# Patient Record
Sex: FEMALE | Race: WHITE | NOT HISPANIC OR LATINO | URBAN - METROPOLITAN AREA
[De-identification: names, ages, dates, MRNs, and addresses within clinical notes are randomized per-mention and may not be internally consistent; named-entity substitution may affect disease eponyms.]

---

## 2018-02-24 ENCOUNTER — EMERGENCY (EMERGENCY)
Facility: HOSPITAL | Age: 68
LOS: 0 days | Discharge: HOME | End: 2018-02-24
Attending: EMERGENCY MEDICINE
Payer: MEDICARE

## 2018-02-24 VITALS
DIASTOLIC BLOOD PRESSURE: 84 MMHG | TEMPERATURE: 98 F | OXYGEN SATURATION: 100 % | RESPIRATION RATE: 18 BRPM | HEIGHT: 68 IN | WEIGHT: 293 LBS | HEART RATE: 92 BPM | SYSTOLIC BLOOD PRESSURE: 166 MMHG

## 2018-02-24 DIAGNOSIS — M54.9 DORSALGIA, UNSPECIFIED: ICD-10-CM

## 2018-02-24 DIAGNOSIS — Z76.0 ENCOUNTER FOR ISSUE OF REPEAT PRESCRIPTION: ICD-10-CM

## 2018-02-24 DIAGNOSIS — Z91.013 ALLERGY TO SEAFOOD: ICD-10-CM

## 2018-02-24 DIAGNOSIS — M19.90 UNSPECIFIED OSTEOARTHRITIS, UNSPECIFIED SITE: ICD-10-CM

## 2018-02-24 DIAGNOSIS — Z88.2 ALLERGY STATUS TO SULFONAMIDES: ICD-10-CM

## 2018-02-24 DIAGNOSIS — Z88.0 ALLERGY STATUS TO PENICILLIN: ICD-10-CM

## 2018-02-24 PROCEDURE — 99283 EMERGENCY DEPT VISIT LOW MDM: CPT

## 2018-02-24 RX ORDER — OXYCODONE HYDROCHLORIDE 5 MG/1
10 TABLET ORAL ONCE
Qty: 0 | Refills: 0 | Status: DISCONTINUED | OUTPATIENT
Start: 2018-02-24 | End: 2018-02-24

## 2018-02-24 RX ADMIN — OXYCODONE HYDROCHLORIDE 10 MILLIGRAM(S): 5 TABLET ORAL at 04:14

## 2018-02-24 NOTE — ED PROVIDER NOTE - MEDICAL DECISION MAKING DETAILS
Pt requesting medication refill due to loss off current Rx, confirmed on  pt has only been receiving monthly Rx from PMD.  Pt given one dose in ED & given Rx for weekend.  Given pain mgmt follow up for monday.  Cautioned on use of meds and discussed dangers of medication abuse.  Pt dc home to fu w PMD, instructed to return to ED if sx worsen.
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
## 2018-02-24 NOTE — ED PROVIDER NOTE - PHYSICAL EXAMINATION
Gen: Alert, NAD  Head: NC, AT, EOMI, normal lids/conjunctiva  ENT: normal hearing, patent oropharynx, MMM  Neck: supple, no tenderness/meningismus/JVD, Trachea midline, FROM  Pulm: Bilateral clear BS, normal resp effort, no wheeze/stridor/retractions  CV: RRR, no M/R/G, +dist pulses  Abd: soft, NT/ND, +BS, no guarding/rebound tenderness, +obese  Mskel: no edema/erythema/cyanosis  Skin: no rash  Neuro: AAOx3, no sensory/motor deficits

## 2021-04-05 ENCOUNTER — INPATIENT (INPATIENT)
Facility: HOSPITAL | Age: 71
LOS: 4 days | Discharge: NURSING, REHAB OR LONG-TERM CARE | End: 2021-04-10
Attending: INTERNAL MEDICINE | Admitting: INTERNAL MEDICINE
Payer: MEDICARE

## 2021-04-05 VITALS
OXYGEN SATURATION: 84 % | TEMPERATURE: 97 F | HEART RATE: 84 BPM | WEIGHT: 250 LBS | SYSTOLIC BLOOD PRESSURE: 61 MMHG | RESPIRATION RATE: 16 BRPM | HEIGHT: 68 IN | DIASTOLIC BLOOD PRESSURE: 35 MMHG

## 2021-04-05 LAB
ACETONE SERPL-MCNC: NEGATIVE — SIGNIFICANT CHANGE UP
ALBUMIN SERPL ELPH-MCNC: 2.6 G/DL — LOW (ref 3.3–5)
ALP SERPL-CCNC: 194 U/L — HIGH (ref 40–120)
ALT FLD-CCNC: 34 U/L — SIGNIFICANT CHANGE UP (ref 12–78)
ANION GAP SERPL CALC-SCNC: 12 MMOL/L — SIGNIFICANT CHANGE UP (ref 5–17)
ANION GAP SERPL CALC-SCNC: 12 MMOL/L — SIGNIFICANT CHANGE UP (ref 5–17)
ANION GAP SERPL CALC-SCNC: 17 MMOL/L — SIGNIFICANT CHANGE UP (ref 5–17)
ANISOCYTOSIS BLD QL: SLIGHT — SIGNIFICANT CHANGE UP
APPEARANCE UR: ABNORMAL
APTT BLD: 39.9 SEC — HIGH (ref 27.5–35.5)
AST SERPL-CCNC: 109 U/L — HIGH (ref 15–37)
BACTERIA # UR AUTO: ABNORMAL
BASE EXCESS BLDA CALC-SCNC: -7.6 MMOL/L — LOW (ref -2–2)
BASOPHILS # BLD AUTO: 0 K/UL — SIGNIFICANT CHANGE UP (ref 0–0.2)
BASOPHILS NFR BLD AUTO: 0 % — SIGNIFICANT CHANGE UP (ref 0–2)
BILIRUB SERPL-MCNC: 0.6 MG/DL — SIGNIFICANT CHANGE UP (ref 0.2–1.2)
BILIRUB UR-MCNC: ABNORMAL
BLD GP AB SCN SERPL QL: SIGNIFICANT CHANGE UP
BLOOD GAS COMMENTS: SIGNIFICANT CHANGE UP
BLOOD GAS COMMENTS: SIGNIFICANT CHANGE UP
BLOOD GAS SOURCE: SIGNIFICANT CHANGE UP
BUN SERPL-MCNC: 76 MG/DL — HIGH (ref 7–23)
BUN SERPL-MCNC: 77 MG/DL — HIGH (ref 7–23)
BUN SERPL-MCNC: 77 MG/DL — HIGH (ref 7–23)
CALCIUM SERPL-MCNC: 6.9 MG/DL — LOW (ref 8.5–10.1)
CALCIUM SERPL-MCNC: 7.1 MG/DL — LOW (ref 8.5–10.1)
CALCIUM SERPL-MCNC: 8 MG/DL — LOW (ref 8.5–10.1)
CHLORIDE SERPL-SCNC: 104 MMOL/L — SIGNIFICANT CHANGE UP (ref 96–108)
CHLORIDE SERPL-SCNC: 106 MMOL/L — SIGNIFICANT CHANGE UP (ref 96–108)
CHLORIDE SERPL-SCNC: 98 MMOL/L — SIGNIFICANT CHANGE UP (ref 96–108)
CK SERPL-CCNC: 1866 U/L — HIGH (ref 26–192)
CK SERPL-CCNC: 2018 U/L — HIGH (ref 26–192)
CO2 SERPL-SCNC: 19 MMOL/L — LOW (ref 22–31)
CO2 SERPL-SCNC: 19 MMOL/L — LOW (ref 22–31)
CO2 SERPL-SCNC: 20 MMOL/L — LOW (ref 22–31)
COLOR SPEC: YELLOW — SIGNIFICANT CHANGE UP
CREAT SERPL-MCNC: 4.87 MG/DL — HIGH (ref 0.5–1.3)
CREAT SERPL-MCNC: 5.11 MG/DL — HIGH (ref 0.5–1.3)
CREAT SERPL-MCNC: 5.74 MG/DL — HIGH (ref 0.5–1.3)
DIFF PNL FLD: ABNORMAL
EOSINOPHIL # BLD AUTO: 0.13 K/UL — SIGNIFICANT CHANGE UP (ref 0–0.5)
EOSINOPHIL NFR BLD AUTO: 1 % — SIGNIFICANT CHANGE UP (ref 0–6)
FLUAV AG NPH QL: SIGNIFICANT CHANGE UP
FLUBV AG NPH QL: SIGNIFICANT CHANGE UP
GLUCOSE BLDC GLUCOMTR-MCNC: 100 MG/DL — HIGH (ref 70–99)
GLUCOSE SERPL-MCNC: 103 MG/DL — HIGH (ref 70–99)
GLUCOSE SERPL-MCNC: 89 MG/DL — SIGNIFICANT CHANGE UP (ref 70–99)
GLUCOSE SERPL-MCNC: 96 MG/DL — SIGNIFICANT CHANGE UP (ref 70–99)
GLUCOSE UR QL: NEGATIVE MG/DL — SIGNIFICANT CHANGE UP
HCO3 BLDA-SCNC: 15 MMOL/L — LOW (ref 21–29)
HCT VFR BLD CALC: 27 % — LOW (ref 34.5–45)
HCT VFR BLD CALC: 28.7 % — LOW (ref 34.5–45)
HCT VFR BLD CALC: 29.1 % — LOW (ref 34.5–45)
HCT VFR BLD CALC: 29.5 % — LOW (ref 34.5–45)
HCT VFR BLD CALC: 34.9 % — SIGNIFICANT CHANGE UP (ref 34.5–45)
HGB BLD-MCNC: 10.5 G/DL — LOW (ref 11.5–15.5)
HGB BLD-MCNC: 8.2 G/DL — LOW (ref 11.5–15.5)
HGB BLD-MCNC: 8.7 G/DL — LOW (ref 11.5–15.5)
HGB BLD-MCNC: 8.8 G/DL — LOW (ref 11.5–15.5)
HGB BLD-MCNC: 8.9 G/DL — LOW (ref 11.5–15.5)
HOROWITZ INDEX BLDA+IHG-RTO: 21 — SIGNIFICANT CHANGE UP
INR BLD: 1.92 RATIO — HIGH (ref 0.88–1.16)
KETONES UR-MCNC: NEGATIVE — SIGNIFICANT CHANGE UP
LACTATE SERPL-SCNC: 1.5 MMOL/L — SIGNIFICANT CHANGE UP (ref 0.7–2)
LACTATE SERPL-SCNC: 1.8 MMOL/L — SIGNIFICANT CHANGE UP (ref 0.7–2)
LACTATE SERPL-SCNC: 2.3 MMOL/L — HIGH (ref 0.7–2)
LEUKOCYTE ESTERASE UR-ACNC: ABNORMAL
LYMPHOCYTES # BLD AUTO: 1.18 K/UL — SIGNIFICANT CHANGE UP (ref 1–3.3)
LYMPHOCYTES # BLD AUTO: 9 % — LOW (ref 13–44)
MAGNESIUM SERPL-MCNC: 4.8 MG/DL — HIGH (ref 1.6–2.6)
MAGNESIUM SERPL-MCNC: 5.1 MG/DL — HIGH (ref 1.6–2.6)
MAGNESIUM SERPL-MCNC: 5.5 MG/DL — HIGH (ref 1.6–2.6)
MANUAL SMEAR VERIFICATION: SIGNIFICANT CHANGE UP
MCHC RBC-ENTMCNC: 27 PG — SIGNIFICANT CHANGE UP (ref 27–34)
MCHC RBC-ENTMCNC: 27.1 PG — SIGNIFICANT CHANGE UP (ref 27–34)
MCHC RBC-ENTMCNC: 27.2 PG — SIGNIFICANT CHANGE UP (ref 27–34)
MCHC RBC-ENTMCNC: 27.3 PG — SIGNIFICANT CHANGE UP (ref 27–34)
MCHC RBC-ENTMCNC: 27.5 PG — SIGNIFICANT CHANGE UP (ref 27–34)
MCHC RBC-ENTMCNC: 30.1 GM/DL — LOW (ref 32–36)
MCHC RBC-ENTMCNC: 30.2 GM/DL — LOW (ref 32–36)
MCHC RBC-ENTMCNC: 30.2 GM/DL — LOW (ref 32–36)
MCHC RBC-ENTMCNC: 30.3 GM/DL — LOW (ref 32–36)
MCHC RBC-ENTMCNC: 30.4 GM/DL — LOW (ref 32–36)
MCV RBC AUTO: 88.8 FL — SIGNIFICANT CHANGE UP (ref 80–100)
MCV RBC AUTO: 90 FL — SIGNIFICANT CHANGE UP (ref 80–100)
MCV RBC AUTO: 90.1 FL — SIGNIFICANT CHANGE UP (ref 80–100)
MCV RBC AUTO: 90.2 FL — SIGNIFICANT CHANGE UP (ref 80–100)
MCV RBC AUTO: 91 FL — SIGNIFICANT CHANGE UP (ref 80–100)
MICROCYTES BLD QL: SLIGHT — SIGNIFICANT CHANGE UP
MONOCYTES # BLD AUTO: 0.92 K/UL — HIGH (ref 0–0.9)
MONOCYTES NFR BLD AUTO: 7 % — SIGNIFICANT CHANGE UP (ref 2–14)
NEUTROPHILS # BLD AUTO: 10.86 K/UL — HIGH (ref 1.8–7.4)
NEUTROPHILS NFR BLD AUTO: 78 % — HIGH (ref 43–77)
NEUTS BAND # BLD: 5 % — SIGNIFICANT CHANGE UP (ref 0–8)
NITRITE UR-MCNC: NEGATIVE — SIGNIFICANT CHANGE UP
NRBC # BLD: 0 /100 WBCS — SIGNIFICANT CHANGE UP (ref 0–0)
NRBC # BLD: 0 /100 — SIGNIFICANT CHANGE UP (ref 0–0)
NRBC # BLD: SIGNIFICANT CHANGE UP /100 WBCS (ref 0–0)
PCO2 BLDA: 23 MMHG — LOW (ref 32–46)
PH BLD: 7.44 — SIGNIFICANT CHANGE UP (ref 7.35–7.45)
PH UR: 5 — SIGNIFICANT CHANGE UP (ref 5–8)
PHOSPHATE SERPL-MCNC: 7.3 MG/DL — HIGH (ref 2.5–4.5)
PHOSPHATE SERPL-MCNC: 7.6 MG/DL — HIGH (ref 2.5–4.5)
PLAT MORPH BLD: NORMAL — SIGNIFICANT CHANGE UP
PLATELET # BLD AUTO: 270 K/UL — SIGNIFICANT CHANGE UP (ref 150–400)
PLATELET # BLD AUTO: 272 K/UL — SIGNIFICANT CHANGE UP (ref 150–400)
PLATELET # BLD AUTO: 277 K/UL — SIGNIFICANT CHANGE UP (ref 150–400)
PLATELET # BLD AUTO: 281 K/UL — SIGNIFICANT CHANGE UP (ref 150–400)
PLATELET # BLD AUTO: 347 K/UL — SIGNIFICANT CHANGE UP (ref 150–400)
PO2 BLDA: 115 MMHG — HIGH (ref 74–108)
POTASSIUM SERPL-MCNC: 4.8 MMOL/L — SIGNIFICANT CHANGE UP (ref 3.5–5.3)
POTASSIUM SERPL-MCNC: 5.7 MMOL/L — HIGH (ref 3.5–5.3)
POTASSIUM SERPL-MCNC: 5.9 MMOL/L — HIGH (ref 3.5–5.3)
POTASSIUM SERPL-SCNC: 4.8 MMOL/L — SIGNIFICANT CHANGE UP (ref 3.5–5.3)
POTASSIUM SERPL-SCNC: 5.7 MMOL/L — HIGH (ref 3.5–5.3)
POTASSIUM SERPL-SCNC: 5.9 MMOL/L — HIGH (ref 3.5–5.3)
PROT SERPL-MCNC: 7.5 GM/DL — SIGNIFICANT CHANGE UP (ref 6–8.3)
PROT UR-MCNC: 30 MG/DL
PROTHROM AB SERPL-ACNC: 21.6 SEC — HIGH (ref 10.6–13.6)
RBC # BLD: 3.04 M/UL — LOW (ref 3.8–5.2)
RBC # BLD: 3.19 M/UL — LOW (ref 3.8–5.2)
RBC # BLD: 3.23 M/UL — LOW (ref 3.8–5.2)
RBC # BLD: 3.24 M/UL — LOW (ref 3.8–5.2)
RBC # BLD: 3.87 M/UL — SIGNIFICANT CHANGE UP (ref 3.8–5.2)
RBC # FLD: 22.7 % — HIGH (ref 10.3–14.5)
RBC # FLD: 22.8 % — HIGH (ref 10.3–14.5)
RBC # FLD: 22.8 % — HIGH (ref 10.3–14.5)
RBC # FLD: 22.9 % — HIGH (ref 10.3–14.5)
RBC # FLD: 23 % — HIGH (ref 10.3–14.5)
RBC BLD AUTO: ABNORMAL
RBC CASTS # UR COMP ASSIST: ABNORMAL /HPF (ref 0–4)
SAO2 % BLDA: 98 % — HIGH (ref 92–96)
SARS-COV-2 RNA SPEC QL NAA+PROBE: SIGNIFICANT CHANGE UP
SODIUM SERPL-SCNC: 135 MMOL/L — SIGNIFICANT CHANGE UP (ref 135–145)
SODIUM SERPL-SCNC: 135 MMOL/L — SIGNIFICANT CHANGE UP (ref 135–145)
SODIUM SERPL-SCNC: 137 MMOL/L — SIGNIFICANT CHANGE UP (ref 135–145)
SP GR SPEC: 1.01 — SIGNIFICANT CHANGE UP (ref 1.01–1.02)
UROBILINOGEN FLD QL: NEGATIVE MG/DL — SIGNIFICANT CHANGE UP
WBC # BLD: 11.04 K/UL — HIGH (ref 3.8–10.5)
WBC # BLD: 11.26 K/UL — HIGH (ref 3.8–10.5)
WBC # BLD: 11.74 K/UL — HIGH (ref 3.8–10.5)
WBC # BLD: 11.77 K/UL — HIGH (ref 3.8–10.5)
WBC # BLD: 13.08 K/UL — HIGH (ref 3.8–10.5)
WBC # FLD AUTO: 11.04 K/UL — HIGH (ref 3.8–10.5)
WBC # FLD AUTO: 11.26 K/UL — HIGH (ref 3.8–10.5)
WBC # FLD AUTO: 11.74 K/UL — HIGH (ref 3.8–10.5)
WBC # FLD AUTO: 11.77 K/UL — HIGH (ref 3.8–10.5)
WBC # FLD AUTO: 13.08 K/UL — HIGH (ref 3.8–10.5)
WBC UR QL: ABNORMAL

## 2021-04-05 PROCEDURE — 93010 ELECTROCARDIOGRAM REPORT: CPT

## 2021-04-05 PROCEDURE — 70450 CT HEAD/BRAIN W/O DYE: CPT | Mod: 26,MA

## 2021-04-05 PROCEDURE — 99285 EMERGENCY DEPT VISIT HI MDM: CPT | Mod: CS

## 2021-04-05 PROCEDURE — 71045 X-RAY EXAM CHEST 1 VIEW: CPT | Mod: 26

## 2021-04-05 PROCEDURE — 99223 1ST HOSP IP/OBS HIGH 75: CPT

## 2021-04-05 PROCEDURE — 74176 CT ABD & PELVIS W/O CONTRAST: CPT | Mod: 26

## 2021-04-05 PROCEDURE — 73502 X-RAY EXAM HIP UNI 2-3 VIEWS: CPT | Mod: 26,RT

## 2021-04-05 PROCEDURE — 73552 X-RAY EXAM OF FEMUR 2/>: CPT | Mod: 26,RT

## 2021-04-05 RX ORDER — SODIUM CHLORIDE 9 MG/ML
3500 INJECTION INTRAMUSCULAR; INTRAVENOUS; SUBCUTANEOUS ONCE
Refills: 0 | Status: COMPLETED | OUTPATIENT
Start: 2021-04-05 | End: 2021-04-05

## 2021-04-05 RX ORDER — MIDODRINE HYDROCHLORIDE 2.5 MG/1
10 TABLET ORAL ONCE
Refills: 0 | Status: COMPLETED | OUTPATIENT
Start: 2021-04-05 | End: 2021-04-05

## 2021-04-05 RX ORDER — CEFTRIAXONE 500 MG/1
1000 INJECTION, POWDER, FOR SOLUTION INTRAMUSCULAR; INTRAVENOUS EVERY 24 HOURS
Refills: 0 | Status: COMPLETED | OUTPATIENT
Start: 2021-04-05 | End: 2021-04-07

## 2021-04-05 RX ORDER — SODIUM ZIRCONIUM CYCLOSILICATE 10 G/10G
10 POWDER, FOR SUSPENSION ORAL ONCE
Refills: 0 | Status: DISCONTINUED | OUTPATIENT
Start: 2021-04-05 | End: 2021-04-05

## 2021-04-05 RX ORDER — CYCLOBENZAPRINE HYDROCHLORIDE 10 MG/1
0 TABLET, FILM COATED ORAL
Qty: 0 | Refills: 0 | DISCHARGE

## 2021-04-05 RX ORDER — WARFARIN SODIUM 2.5 MG/1
1 TABLET ORAL
Qty: 0 | Refills: 0 | DISCHARGE

## 2021-04-05 RX ORDER — WARFARIN SODIUM 2.5 MG/1
5 TABLET ORAL ONCE
Refills: 0 | Status: COMPLETED | OUTPATIENT
Start: 2021-04-05 | End: 2021-04-05

## 2021-04-05 RX ORDER — SODIUM CHLORIDE 9 MG/ML
500 INJECTION INTRAMUSCULAR; INTRAVENOUS; SUBCUTANEOUS ONCE
Refills: 0 | Status: COMPLETED | OUTPATIENT
Start: 2021-04-05 | End: 2021-04-05

## 2021-04-05 RX ORDER — SEVELAMER CARBONATE 2400 MG/1
800 POWDER, FOR SUSPENSION ORAL
Refills: 0 | Status: DISCONTINUED | OUTPATIENT
Start: 2021-04-05 | End: 2021-04-10

## 2021-04-05 RX ORDER — MIDODRINE HYDROCHLORIDE 2.5 MG/1
10 TABLET ORAL THREE TIMES A DAY
Refills: 0 | Status: DISCONTINUED | OUTPATIENT
Start: 2021-04-05 | End: 2021-04-05

## 2021-04-05 RX ORDER — DIGOXIN 250 MCG
125 TABLET ORAL DAILY
Refills: 0 | Status: DISCONTINUED | OUTPATIENT
Start: 2021-04-05 | End: 2021-04-05

## 2021-04-05 RX ORDER — SODIUM ZIRCONIUM CYCLOSILICATE 10 G/10G
10 POWDER, FOR SUSPENSION ORAL
Refills: 0 | Status: COMPLETED | OUTPATIENT
Start: 2021-04-05 | End: 2021-04-06

## 2021-04-05 RX ORDER — SODIUM CHLORIDE 9 MG/ML
1000 INJECTION INTRAMUSCULAR; INTRAVENOUS; SUBCUTANEOUS
Refills: 0 | Status: DISCONTINUED | OUTPATIENT
Start: 2021-04-05 | End: 2021-04-08

## 2021-04-05 RX ORDER — GABAPENTIN 400 MG/1
0 CAPSULE ORAL
Qty: 0 | Refills: 0 | DISCHARGE

## 2021-04-05 RX ORDER — FOLIC ACID 0.8 MG
1 TABLET ORAL
Qty: 0 | Refills: 0 | DISCHARGE

## 2021-04-05 RX ORDER — SODIUM CHLORIDE 9 MG/ML
1000 INJECTION INTRAMUSCULAR; INTRAVENOUS; SUBCUTANEOUS ONCE
Refills: 0 | Status: COMPLETED | OUTPATIENT
Start: 2021-04-05 | End: 2021-04-05

## 2021-04-05 RX ORDER — SENNA PLUS 8.6 MG/1
2 TABLET ORAL AT BEDTIME
Refills: 0 | Status: DISCONTINUED | OUTPATIENT
Start: 2021-04-05 | End: 2021-04-10

## 2021-04-05 RX ORDER — ACETAMINOPHEN 500 MG
975 TABLET ORAL ONCE
Refills: 0 | Status: COMPLETED | OUTPATIENT
Start: 2021-04-05 | End: 2021-04-05

## 2021-04-05 RX ORDER — POLYETHYLENE GLYCOL 3350 17 G/17G
17 POWDER, FOR SOLUTION ORAL DAILY
Refills: 0 | Status: DISCONTINUED | OUTPATIENT
Start: 2021-04-05 | End: 2021-04-08

## 2021-04-05 RX ORDER — DILTIAZEM HCL 120 MG
60 CAPSULE, EXT RELEASE 24 HR ORAL
Refills: 0 | Status: DISCONTINUED | OUTPATIENT
Start: 2021-04-05 | End: 2021-04-05

## 2021-04-05 RX ORDER — METOPROLOL TARTRATE 50 MG
25 TABLET ORAL DAILY
Refills: 0 | Status: DISCONTINUED | OUTPATIENT
Start: 2021-04-05 | End: 2021-04-05

## 2021-04-05 RX ORDER — ACETAMINOPHEN 500 MG
650 TABLET ORAL ONCE
Refills: 0 | Status: COMPLETED | OUTPATIENT
Start: 2021-04-05 | End: 2021-04-05

## 2021-04-05 RX ORDER — SODIUM CHLORIDE 9 MG/ML
1000 INJECTION INTRAMUSCULAR; INTRAVENOUS; SUBCUTANEOUS
Refills: 0 | Status: DISCONTINUED | OUTPATIENT
Start: 2021-04-05 | End: 2021-04-05

## 2021-04-05 RX ORDER — RAMELTEON 8 MG
0 TABLET ORAL
Qty: 0 | Refills: 0 | DISCHARGE

## 2021-04-05 RX ORDER — MIDODRINE HYDROCHLORIDE 2.5 MG/1
10 TABLET ORAL THREE TIMES A DAY
Refills: 0 | Status: DISCONTINUED | OUTPATIENT
Start: 2021-04-05 | End: 2021-04-10

## 2021-04-05 RX ADMIN — CEFTRIAXONE 100 MILLIGRAM(S): 500 INJECTION, POWDER, FOR SOLUTION INTRAMUSCULAR; INTRAVENOUS at 18:33

## 2021-04-05 RX ADMIN — SODIUM ZIRCONIUM CYCLOSILICATE 10 GRAM(S): 10 POWDER, FOR SUSPENSION ORAL at 18:30

## 2021-04-05 RX ADMIN — MIDODRINE HYDROCHLORIDE 10 MILLIGRAM(S): 2.5 TABLET ORAL at 18:49

## 2021-04-05 RX ADMIN — Medication 650 MILLIGRAM(S): at 12:56

## 2021-04-05 RX ADMIN — SODIUM CHLORIDE 100 MILLILITER(S): 9 INJECTION INTRAMUSCULAR; INTRAVENOUS; SUBCUTANEOUS at 18:34

## 2021-04-05 RX ADMIN — SODIUM CHLORIDE 1000 MILLILITER(S): 9 INJECTION INTRAMUSCULAR; INTRAVENOUS; SUBCUTANEOUS at 15:07

## 2021-04-05 RX ADMIN — WARFARIN SODIUM 5 MILLIGRAM(S): 2.5 TABLET ORAL at 22:42

## 2021-04-05 RX ADMIN — SENNA PLUS 2 TABLET(S): 8.6 TABLET ORAL at 22:43

## 2021-04-05 RX ADMIN — SODIUM CHLORIDE 3500 MILLILITER(S): 9 INJECTION INTRAMUSCULAR; INTRAVENOUS; SUBCUTANEOUS at 10:50

## 2021-04-05 RX ADMIN — SODIUM CHLORIDE 1000 MILLILITER(S): 9 INJECTION INTRAMUSCULAR; INTRAVENOUS; SUBCUTANEOUS at 18:03

## 2021-04-05 RX ADMIN — SODIUM CHLORIDE 70 MILLILITER(S): 9 INJECTION INTRAMUSCULAR; INTRAVENOUS; SUBCUTANEOUS at 16:51

## 2021-04-05 NOTE — H&P ADULT - NSHPPHYSICALEXAM_GEN_ALL_CORE
PHYSICAL EXAMINATION:  Vital Signs Last 24 Hrs  T(C): 36.2 (05 Apr 2021 10:35), Max: 36.3 (05 Apr 2021 10:01)  T(F): 97.2 (05 Apr 2021 10:35), Max: 97.4 (05 Apr 2021 10:01)  HR: 84 (05 Apr 2021 10:01) (84 - 84)  BP: 114/48 (05 Apr 2021 12:33) (61/35 - 114/48)  BP(mean): --  RR: 22 (05 Apr 2021 12:33) (16 - 22)  SpO2: 100% (05 Apr 2021 12:33) (84% - 100%)  CAPILLARY BLOOD GLUCOSE      POCT Blood Glucose.: 100 mg/dL (05 Apr 2021 10:19)        GENERAL: NAD, well-groomed,  HEAD:  atraumatic, normocephalic  EYES: sclera anicteric  ENMT: mucous membranes moist  NECK: supple, No JVD  CHEST/LUNG: clear to auscultation bilaterally;    no      rales   ,   no rhonchi,   HEART: normal S1, S2  ABDOMEN: BS+, soft, ND, NT   EXTREMITIES:     b/l    edema    b/l LEs  NEURO: awake, ,     moves all extremities  SKIN: no     rash    minor   skin abrasions,  left foot   and left  heel

## 2021-04-05 NOTE — H&P ADULT - HISTORY OF PRESENT ILLNESS
70 years old female      arrived  via   ems from home       pt was found in the bathtub, pt  is not  a good  historian and  pt told  er,, she  was  in  her bath tub for   x 4 days, pt found   with fecal material       Pt is alert and follows verbal commends         Pt is oriented x 3, an  sts she was unable to get up     pt is taking warfarin due to hx of atrial fib.    Now,  pt states, She is unsure,  when she last took her meds.  or  when she fell or how  long she  was in her  bath tub

## 2021-04-05 NOTE — ED ADULT NURSE REASSESSMENT NOTE - NS ED NURSE REASSESS COMMENT FT1
pt awake, alert, bp 99/33 hr-55, MD Merritt made aware. iv fluids in progress at this time. no further intervention noted. pt sent to 1DMD Merritt aware.

## 2021-04-05 NOTE — ED ADULT NURSE REASSESSMENT NOTE - NS ED NURSE REASSESS COMMENT FT1
pt awake, alert, orient x 3, verbal, pt sent to ct scan and xray at this time. iv fluids started. awaiting ED return

## 2021-04-05 NOTE — ED ADULT TRIAGE NOTE - CHIEF COMPLAINT QUOTE
pt a&o x2 bib by ems  found at home well check from sister, approx down time4 days in bath tub. Pt in fecal matter. pt c.o of back pain. Pt house unfit for living.

## 2021-04-05 NOTE — ED ADULT NURSE NOTE - OBJECTIVE STATEMENT
pt awake, alert, found in bathtub. pt states was in bathtub for 4 days does not remember how she got there. stage III sacral decubitus noted. left heel skin tear noted.

## 2021-04-05 NOTE — ED PROVIDER NOTE - PMH
Anxiety    Asthma    Atrial fibrillation    Colitis    Dorsalgia    Dyspnea    Gastritis    Malaise and fatigue    No pertinent past medical history    Pulmonary fibrosis    UTI (urinary tract infection)

## 2021-04-05 NOTE — ED PROVIDER NOTE - CARE PLAN
Principal Discharge DX:	Decubitus skin ulcer  Secondary Diagnosis:	Renal failure  Secondary Diagnosis:	Hyperkalemia

## 2021-04-05 NOTE — ED PROVIDER NOTE - IV ALTEPLASE INCLUSION HIDDEN
tolerated procedure well  pain meds as needed  physical therapy as tolerated  No contraindication to DC to rehab show

## 2021-04-05 NOTE — H&P ADULT - NSHPLABSRESULTS_GEN_ALL_CORE
LABS:                        10.5   13.08 )-----------( 347      ( 05 Apr 2021 11:01 )             34.9     04-05    135  |  98  |  77<H>  ----------------------------<  103<H>  5.7<H>   |  20<L>  |  5.74<H>    Ca    8.0<L>      05 Apr 2021 11:01  Mg     5.5     04-05    TPro  7.5  /  Alb  2.6<L>  /  TBili  0.6  /  DBili  x   /  AST  109<H>  /  ALT  34  /  AlkPhos  194<H>  04-05    PT/INR - ( 05 Apr 2021 11:01 )   PT: 21.6 sec;   INR: 1.92 ratio         PTT - ( 05 Apr 2021 11:01 )  PTT:39.9 sec        Lactate, Blood: 2.3 mmol/L (04-05 @ 11:01)    ABG - ( 05 Apr 2021 13:13 )  pH, Arterial: x     pH, Blood: 7.44  /  pCO2: 23    /  pO2: 115   / HCO3: 15    / Base Excess: -7.6  /  SaO2: 98

## 2021-04-05 NOTE — ED PROVIDER NOTE - OBJECTIVE STATEMENT
70 years old female by ems from home c/o pt was found in the bathtub x 4 days covered with fecal material. Pt is alert and follows verbal commends and c/o pain to the right hip. Pt is oriented x 3 sts she was unable to get up. 70 years old female by ems from home c/o pt was found in the bathtub x 4 days covered with fecal material. Pt is alert and follows verbal commends and c/o pain to the right hip. Pt is oriented x 3 sts she was unable to get up. pt is taking warfarin due to hx of atrial fib.

## 2021-04-05 NOTE — PATIENT PROFILE ADULT - NSPROIMPLANTSMEDDEV_GEN_A_NUR
Pt presents to ED via EMS with c/o bilateral upper abdominal pain with radiation to the back starting last night around 1:30am. Pt reports 6 x BM since last night, reports recent intermittent constipation for several days and rectal bleeding over night. Pt also reports dizziness. PMHx cirrhosis, previous transfusion. Pt denies recent alcohol use. Pt denies N/V, dysuria, fever. Pt reports taking 2 Tylenol per day for pain.  
None

## 2021-04-05 NOTE — ED PROVIDER NOTE - CONSTITUTIONAL, MLM
normal... Well appearing, awake, alert, oriented to person, place, time/situation and in no apparent distress. Speaking in clear full sentences no nasal flaring no shoulders no diaphoresis, appears very comfortable lying in the stretcher in a bright light room

## 2021-04-05 NOTE — CONSULT NOTE ADULT - SUBJECTIVE AND OBJECTIVE BOX
NEPHROLOGY CONSULTATION    CHIEF COMPLAINT:  JOAQUIN    HPI:  Patient was found in bathrub covered in feces for about 4 days.  She is in severe renal failure and BP quite low despite 4 liters of IV fluid.  She denies any prior knowledge of CKD, HTN or DM.  Renal failure is associated with mild hyperkalemia.     ROS:  denies nausea, vomiting, diarrhea, chest pain, fever/chills or SOB      PAST MEDICAL & SURGICAL HISTORY:  Anxiety  UTI (urinary tract infection)  Gastritis  Colitis  Asthma  Dorsalgia  Pulmonary fibrosis  Atrial fibrillation    No significant past surgical history        SOCIAL HISTORY:  lives alone    FAMILY HISTORY:  no CKD      MEDICATIONS  (STANDING):  sodium chloride 0.9% Bolus 1000 milliLiter(s) IV Bolus once      PHYSICAL EXAMINATION:  T(F): 97.2 (04-05-21 @ 10:35)  HR: 84 (04-05-21 @ 10:01)  BP: 114/48 (04-05-21 @ 12:33)  RR: 22 (04-05-21 @ 12:33)  SpO2: 100% (04-05-21 @ 12:33)  Conversant, no apparent distress  PERRLA, pink conjunctivae, no ptosis  Good dentition, no pharyngeal erythema  Neck non tender, no mass, no thyromegaly or nodules  Normal respiratory effort, lungs clear to auscultation  Heart with RRR, no murmurs or rubs, mild-moderate peripheral edema  Abdomen soft, no masses, no organomegaly  Skin no rashes, ulcers or lesions, normal turgor and temperature  Appropriate affect, AO x 3    LABS:                        10.5   13.08 )-----------( 347      ( 05 Apr 2021 11:01 )             34.9     04-05    135  |  98  |  77<H>  ----------------------------<  103<H>  5.7<H>   |  20<L>  |  5.74<H>    Ca    8.0<L>      05 Apr 2021 11:01  Mg     5.5     04-05    TPro  7.5  /  Alb  2.6<L>  /  TBili  0.6  /  DBili  x   /  AST  109<H>  /  ALT  34  /  AlkPhos  194<H>  04-05    Lactate 2.3    ABG 7.49--15      RADIOLOGY:  CT scan personally reviewed - kidneys are small bilaterally with mild-moderate hydronephrosis and distended bladder    ASSESSMENT:  1.  JOAQUIN due to post renal azotemia / urinary retention  2.  Hyperkalemia and hypermagnesemia due to # 1  3.  Hypotension - rule out sepsis from urinary source    PLAN:  Marquez catheter, strict I/O  Continue IV fluid  Blood and urine cultures  Empiric broad spectrum antibiotics

## 2021-04-05 NOTE — PATIENT PROFILE ADULT - NSPROGENOTHERPROVIDER_GEN_A_NUR
-- DO NOT REPLY / DO NOT REPLY ALL --  -- Message is from the [facility]--    COVID-19 Universal Screening: N/A - Not about scheduling    General Patient Message      Reason for Call:  Patient [facility] to have [facility] and [facility]. Fax # [phone]. And referral has to be for 90 days. Daughter would like a call once the referral  has been updated     Caller Information       Type Contact Phone    03/18/2021 08:43 AM CDT Phone (Incoming) Mark Chappell (Emergency Contact) [phone]          Alternative phone number: none    Turnaround time given to caller:   \"This message will be sent to [state Provider's name]. The clinical team will fulfill your request as soon as they review your message.\"     none

## 2021-04-05 NOTE — ED ADULT NURSE NOTE - NSIMPLEMENTINTERV_GEN_ALL_ED
Implemented All Fall Risk Interventions:  Trail to call system. Call bell, personal items and telephone within reach. Instruct patient to call for assistance. Room bathroom lighting operational. Non-slip footwear when patient is off stretcher. Physically safe environment: no spills, clutter or unnecessary equipment. Stretcher in lowest position, wheels locked, appropriate side rails in place. Provide visual cue, wrist band, yellow gown, etc. Monitor gait and stability. Monitor for mental status changes and reorient to person, place, and time. Review medications for side effects contributing to fall risk. Reinforce activity limits and safety measures with patient and family.

## 2021-04-05 NOTE — H&P ADULT - ASSESSMENT
70 years old female      arrived  via   ems from home       pt was found in the bathtub, pt  is not  a good  historian and  pt told  er,, she  was  in  her bath tub for   x 4 days, pt found   with fecal material        Pt is oriented x 3, an  sts she was unable to get up     pt  on coumadin ,   hx of atrial fib.    Now,  pt states, that she is unsure,  when she last took her meds.  or  when she fell or how  long she  was in her  bath tub        #  Syncope         r/o arrthymias      Ct   head no bleed/ x  ray  pelvis, no fx      tele monitoring       Echo    #    h/o Afib       on coumadin. INR IS  1.9     #   wbc of  13,000,  from fall     #  Anemia, hb is 10/ ?  c/c      #    JOAQUIN, crt is 5. potassium of  5.7, hemolysed      renal dr good/ [facility]    #  elevated  lfts    #   check cpk/  r/o Rhabdomyolysis    #  obesity/ wt pending     PT  eval      70 years old female      arrived  via   ems from home       pt was found in the bathtub, pt  is not  a good  historian and  pt told  er,, she  was  in  her bath tub for   x 4 days, pt found   with fecal material        Pt is oriented x 3, an  sts she was unable to get up     pt  on coumadin ,   hx of atrial fib.    Now,  pt states, that she is unsure,  when she last took her meds.  or  when she fell or how  long she  was in her  bath tub        #  Syncope         r/o arrthymias      Ct   head no bleed/ x  ray  pelvis, no fx       tele monitoring       Echo    #    h/o Afib       on coumadin. INR IS  1.9/         her  bottle of coumadin is for 5  mg, in her bag     #   wbc of  13,000,  from fall     #  Anemia, hb is 10/ ?  c/c      #    JOAQUIN, crt is 5. potassium of  5.7, hemolysed      renal dr good/ [facility]    #  elevated  lfts    #   check cpk/  r/o Rhabdomyolysis    #  sacral decubitus/ wound care    #  obesity/ wt pending     PT  eval      70 years old female      arrived  via   ems from home       pt was found in the bathtub, pt  is not  a good  historian and  pt told  er,, she  was  in  her bath tub for   x 4 days, pt found   with fecal material        Pt is oriented x 3, an  sts she was unable to get up     pt  on coumadin ,   hx of atrial fib.    Now,  pt states, that she is unsure,  when she last took her meds.  or  when she fell or how  long she  was in her  bath tub        #  Syncope         r/o arrthymias      Ct   head no bleed/ x  ray  pelvis, no fx       tele monitoring       Echo    #    h/o Afib       on coumadin. INR IS  1.9/         her  bottle of coumadin is for 5  mg, in her bag       dogoxin and cardizem, stopped. on toprol     #   wbc of  13,000,  from fall     #  Anemia, hb is 10/ ?  c/c      #    JOAQUIN, crt is 5. potassium of  5.7, hemolysed      renal dr good/ [facility]    #  elevated  lfts    #   check cpk/  r/o Rhabdomyolysis    #  sacral decubitus/ wound care    #  obesity/ wt pending     PT  eval    unable to reach family      70 years old female      arrived  via   ems from home       pt was found in the bathtub, pt  is not  a good  historian and  pt told  er,, she  was  in  her bath tub for   x 4 days, pt found   with fecal material        Pt is oriented x 3, an  sts she was unable to get up     pt  on coumadin ,   hx of atrial fib.    Now,  pt states, that she is unsure,  when she last took her meds.  or  when she fell or how  long she  was in her  bath tub        #  Syncope         r/o arrthymias      Ct   head no bleed/ x  ray  pelvis, no fx       tele monitoring       Echo    #    h/o Afib       on coumadin. INR IS  1.9/         her  bottle of coumadin is for 5  mg, in her bag       dogoxin and cardizem, stopped. on toprol     #   wbc of  13,000,  from fall     #  Anemia, hb is 10/ ?  c/c      #    JOAQUIN, crt is 5. potassium of  5.7, hemolysed      renal dr good/ [facility]    #  elevated  lfts    #   check cpk/  r/o Rhabdomyolysis    #  sacral decubitus/ wound care    #  obesity/ wt pending     PT  eval    unable to reach family     addendum/  6  pm'  rhabdomyolysis. on iv  fluids   hb  is  8  now.  stat  Ct  A/P, ordered and follow  stat  hb   if  Hb   declines, then will   need to  stop  coumadin/  gi  dr singh    70 years old female      arrived  via   ems from home       pt was found in the bathtub, pt  is not  a good  historian and  pt told  er,, she  was  in  her bath tub for   x 4 days, pt found   with fecal material        Pt is oriented x 3, an  sts she was unable to get up     pt  on coumadin ,   hx of atrial fib.    Now,  pt states, that she is unsure,  when she last took her meds.  or  when she fell or how  long she  was in her  bath tub        #  Syncope         r/o arrthymias      Ct   head no bleed/ x  ray  pelvis, no fx       tele monitoring       Echo    #    h/o Afib       on coumadin. INR IS  1.9/         her  bottle of coumadin is for 5  mg, in her bag       dogoxin and cardizem, stopped. on toprol     #   wbc of  13,000,  from fall     #  Anemia, hb is 10/ ?  c/c      #    JOAQUIN, crt is 5. potassium of  5.7, hemolysed      renal dr good/ [facility]    #  elevated  lfts    #   check cpk/  r/o Rhabdomyolysis    #  sacral decubitus/ wound care    #  obesity/ bmi is  38     PT  eval    unable to reach family     addendum/  6  pm'  rhabdomyolysis. on iv  fluids   hb  is  8  now  .  Ct  A/P,  no bleed.  met liver, RP and  pelvic  adenopathy,  distended  bladder. stool   if  Hb   declines, then will   need to  stop  coumadin/  gi  dr singh   pt with metastatic  disease. oncology dr campbell     td< from: CT Renal Stone Hunt (04.05.21 @ 14:29) >  IMPRESSION:  Heterogeneous liver suspicious for metastatic disease. Recommend follow-up ultrasound, contrast enhanced CT or MRI.  Retroperitoneal and pelvic adenopathy suspicious for metastatic disease.  Marked bladder distentionwith mild bilateral hydroureteronephrosis.  Impacted rectosigmoid colon may contribute to bladder outlet obstruction  < end of copied text >

## 2021-04-06 LAB
A1C WITH ESTIMATED AVERAGE GLUCOSE RESULT: 4.8 % — SIGNIFICANT CHANGE UP (ref 4–5.6)
ALBUMIN SERPL ELPH-MCNC: 1.8 G/DL — LOW (ref 3.3–5)
ALP SERPL-CCNC: 134 U/L — HIGH (ref 40–120)
ALT FLD-CCNC: 34 U/L — SIGNIFICANT CHANGE UP (ref 12–78)
ANION GAP SERPL CALC-SCNC: 13 MMOL/L — SIGNIFICANT CHANGE UP (ref 5–17)
AST SERPL-CCNC: 98 U/L — HIGH (ref 15–37)
BILIRUB DIRECT SERPL-MCNC: 0.26 MG/DL — HIGH (ref 0.05–0.2)
BILIRUB INDIRECT FLD-MCNC: 0.2 MG/DL — SIGNIFICANT CHANGE UP (ref 0.2–1)
BILIRUB SERPL-MCNC: 0.5 MG/DL — SIGNIFICANT CHANGE UP (ref 0.2–1.2)
BUN SERPL-MCNC: 74 MG/DL — HIGH (ref 7–23)
CALCIUM SERPL-MCNC: 6.9 MG/DL — LOW (ref 8.5–10.1)
CHLORIDE SERPL-SCNC: 108 MMOL/L — SIGNIFICANT CHANGE UP (ref 96–108)
CK SERPL-CCNC: 1018 U/L — HIGH (ref 26–192)
CO2 SERPL-SCNC: 19 MMOL/L — LOW (ref 22–31)
COVID-19 SPIKE DOMAIN AB INTERP: POSITIVE
COVID-19 SPIKE DOMAIN ANTIBODY RESULT: 3.65 U/ML — HIGH
CREAT ?TM UR-MCNC: 51 MG/DL — SIGNIFICANT CHANGE UP
CREAT SERPL-MCNC: 4.2 MG/DL — HIGH (ref 0.5–1.3)
CULTURE RESULTS: SIGNIFICANT CHANGE UP
ESTIMATED AVERAGE GLUCOSE: 91 MG/DL — SIGNIFICANT CHANGE UP (ref 68–114)
GLUCOSE SERPL-MCNC: 79 MG/DL — SIGNIFICANT CHANGE UP (ref 70–99)
GRAM STN FLD: SIGNIFICANT CHANGE UP
GRAM STN FLD: SIGNIFICANT CHANGE UP
HCT VFR BLD CALC: 26.2 % — LOW (ref 34.5–45)
HCT VFR BLD CALC: 26.4 % — LOW (ref 34.5–45)
HCT VFR BLD CALC: 26.8 % — LOW (ref 34.5–45)
HCT VFR BLD CALC: 27.3 % — LOW (ref 34.5–45)
HCV AB S/CO SERPL IA: 0.06 S/CO — SIGNIFICANT CHANGE UP (ref 0–0.99)
HCV AB SERPL-IMP: SIGNIFICANT CHANGE UP
HGB BLD-MCNC: 7.9 G/DL — LOW (ref 11.5–15.5)
HGB BLD-MCNC: 8 G/DL — LOW (ref 11.5–15.5)
HGB BLD-MCNC: 8 G/DL — LOW (ref 11.5–15.5)
HGB BLD-MCNC: 8.1 G/DL — LOW (ref 11.5–15.5)
INR BLD: 2.15 RATIO — HIGH (ref 0.88–1.16)
MAGNESIUM SERPL-MCNC: 4.4 MG/DL — HIGH (ref 1.6–2.6)
MCHC RBC-ENTMCNC: 26.7 PG — LOW (ref 27–34)
MCHC RBC-ENTMCNC: 27.1 PG — SIGNIFICANT CHANGE UP (ref 27–34)
MCHC RBC-ENTMCNC: 27.2 PG — SIGNIFICANT CHANGE UP (ref 27–34)
MCHC RBC-ENTMCNC: 27.3 PG — SIGNIFICANT CHANGE UP (ref 27–34)
MCHC RBC-ENTMCNC: 29.5 GM/DL — LOW (ref 32–36)
MCHC RBC-ENTMCNC: 29.7 GM/DL — LOW (ref 32–36)
MCHC RBC-ENTMCNC: 30.3 GM/DL — LOW (ref 32–36)
MCHC RBC-ENTMCNC: 30.5 GM/DL — LOW (ref 32–36)
MCV RBC AUTO: 89.1 FL — SIGNIFICANT CHANGE UP (ref 80–100)
MCV RBC AUTO: 90.1 FL — SIGNIFICANT CHANGE UP (ref 80–100)
MCV RBC AUTO: 90.5 FL — SIGNIFICANT CHANGE UP (ref 80–100)
MCV RBC AUTO: 91.3 FL — SIGNIFICANT CHANGE UP (ref 80–100)
METHOD TYPE: SIGNIFICANT CHANGE UP
NRBC # BLD: 0 /100 WBCS — SIGNIFICANT CHANGE UP (ref 0–0)
P MIRABILIS DNA BLD POS QL NAA+PROBE: SIGNIFICANT CHANGE UP
PHOSPHATE SERPL-MCNC: 7.3 MG/DL — HIGH (ref 2.5–4.5)
PLATELET # BLD AUTO: 238 K/UL — SIGNIFICANT CHANGE UP (ref 150–400)
PLATELET # BLD AUTO: 240 K/UL — SIGNIFICANT CHANGE UP (ref 150–400)
PLATELET # BLD AUTO: 245 K/UL — SIGNIFICANT CHANGE UP (ref 150–400)
PLATELET # BLD AUTO: 250 K/UL — SIGNIFICANT CHANGE UP (ref 150–400)
POTASSIUM SERPL-MCNC: 4.2 MMOL/L — SIGNIFICANT CHANGE UP (ref 3.5–5.3)
POTASSIUM SERPL-SCNC: 4.2 MMOL/L — SIGNIFICANT CHANGE UP (ref 3.5–5.3)
PROT ?TM UR-MCNC: 24 MG/DL — HIGH (ref 0–12)
PROT SERPL-MCNC: 5.2 GM/DL — LOW (ref 6–8.3)
PROT/CREAT UR-RTO: 0.5 RATIO — HIGH (ref 0–0.2)
PROTHROM AB SERPL-ACNC: 24 SEC — HIGH (ref 10.6–13.6)
RBC # BLD: 2.93 M/UL — LOW (ref 3.8–5.2)
RBC # BLD: 2.94 M/UL — LOW (ref 3.8–5.2)
RBC # BLD: 2.96 M/UL — LOW (ref 3.8–5.2)
RBC # BLD: 2.99 M/UL — LOW (ref 3.8–5.2)
RBC # FLD: 22.7 % — HIGH (ref 10.3–14.5)
RBC # FLD: 22.8 % — HIGH (ref 10.3–14.5)
RBC # FLD: 22.9 % — HIGH (ref 10.3–14.5)
RBC # FLD: 22.9 % — HIGH (ref 10.3–14.5)
SARS-COV-2 IGG+IGM SERPL QL IA: 3.65 U/ML — HIGH
SARS-COV-2 IGG+IGM SERPL QL IA: POSITIVE
SODIUM SERPL-SCNC: 140 MMOL/L — SIGNIFICANT CHANGE UP (ref 135–145)
SODIUM UR-SCNC: 68 MMOL/L — SIGNIFICANT CHANGE UP
SPECIMEN SOURCE: SIGNIFICANT CHANGE UP
WBC # BLD: 7.16 K/UL — SIGNIFICANT CHANGE UP (ref 3.8–10.5)
WBC # BLD: 7.57 K/UL — SIGNIFICANT CHANGE UP (ref 3.8–10.5)
WBC # BLD: 8.53 K/UL — SIGNIFICANT CHANGE UP (ref 3.8–10.5)
WBC # BLD: 8.77 K/UL — SIGNIFICANT CHANGE UP (ref 3.8–10.5)
WBC # FLD AUTO: 7.16 K/UL — SIGNIFICANT CHANGE UP (ref 3.8–10.5)
WBC # FLD AUTO: 7.57 K/UL — SIGNIFICANT CHANGE UP (ref 3.8–10.5)
WBC # FLD AUTO: 8.53 K/UL — SIGNIFICANT CHANGE UP (ref 3.8–10.5)
WBC # FLD AUTO: 8.77 K/UL — SIGNIFICANT CHANGE UP (ref 3.8–10.5)

## 2021-04-06 PROCEDURE — 99233 SBSQ HOSP IP/OBS HIGH 50: CPT

## 2021-04-06 PROCEDURE — 93306 TTE W/DOPPLER COMPLETE: CPT | Mod: 26

## 2021-04-06 RX ORDER — OXYCODONE AND ACETAMINOPHEN 5; 325 MG/1; MG/1
1 TABLET ORAL EVERY 6 HOURS
Refills: 0 | Status: DISCONTINUED | OUTPATIENT
Start: 2021-04-06 | End: 2021-04-07

## 2021-04-06 RX ORDER — CHLORHEXIDINE GLUCONATE 213 G/1000ML
1 SOLUTION TOPICAL
Refills: 0 | Status: DISCONTINUED | OUTPATIENT
Start: 2021-04-06 | End: 2021-04-10

## 2021-04-06 RX ORDER — WARFARIN SODIUM 2.5 MG/1
5 TABLET ORAL ONCE
Refills: 0 | Status: COMPLETED | OUTPATIENT
Start: 2021-04-06 | End: 2021-04-06

## 2021-04-06 RX ADMIN — SODIUM ZIRCONIUM CYCLOSILICATE 10 GRAM(S): 10 POWDER, FOR SUSPENSION ORAL at 18:32

## 2021-04-06 RX ADMIN — SEVELAMER CARBONATE 800 MILLIGRAM(S): 2400 POWDER, FOR SUSPENSION ORAL at 08:51

## 2021-04-06 RX ADMIN — CEFTRIAXONE 100 MILLIGRAM(S): 500 INJECTION, POWDER, FOR SOLUTION INTRAMUSCULAR; INTRAVENOUS at 18:29

## 2021-04-06 RX ADMIN — Medication 5 MILLIGRAM(S): at 05:50

## 2021-04-06 RX ADMIN — MIDODRINE HYDROCHLORIDE 10 MILLIGRAM(S): 2.5 TABLET ORAL at 02:55

## 2021-04-06 RX ADMIN — MIDODRINE HYDROCHLORIDE 10 MILLIGRAM(S): 2.5 TABLET ORAL at 18:31

## 2021-04-06 RX ADMIN — SENNA PLUS 2 TABLET(S): 8.6 TABLET ORAL at 22:24

## 2021-04-06 RX ADMIN — MIDODRINE HYDROCHLORIDE 10 MILLIGRAM(S): 2.5 TABLET ORAL at 05:50

## 2021-04-06 RX ADMIN — SEVELAMER CARBONATE 800 MILLIGRAM(S): 2400 POWDER, FOR SUSPENSION ORAL at 12:27

## 2021-04-06 RX ADMIN — SEVELAMER CARBONATE 800 MILLIGRAM(S): 2400 POWDER, FOR SUSPENSION ORAL at 18:31

## 2021-04-06 RX ADMIN — MIDODRINE HYDROCHLORIDE 10 MILLIGRAM(S): 2.5 TABLET ORAL at 12:27

## 2021-04-06 RX ADMIN — SODIUM ZIRCONIUM CYCLOSILICATE 10 GRAM(S): 10 POWDER, FOR SUSPENSION ORAL at 05:50

## 2021-04-06 RX ADMIN — WARFARIN SODIUM 5 MILLIGRAM(S): 2.5 TABLET ORAL at 23:36

## 2021-04-06 RX ADMIN — POLYETHYLENE GLYCOL 3350 17 GRAM(S): 17 POWDER, FOR SOLUTION ORAL at 12:27

## 2021-04-06 RX ADMIN — SODIUM CHLORIDE 100 MILLILITER(S): 9 INJECTION INTRAMUSCULAR; INTRAVENOUS; SUBCUTANEOUS at 18:27

## 2021-04-06 RX ADMIN — Medication 5 MILLIGRAM(S): at 18:31

## 2021-04-06 NOTE — CONSULT NOTE ADULT - ASSESSMENT
HPI:       70 years old female      arrived  via   ems from home       pt was found in the bathtub, pt  is not  a good  historian and  pt told  er,, she  was  in  her bath tub for   x 4 days, pt found   with fecal material       Pt is alert and follows verbal commends         Pt is oriented x 3, an  sts she was unable to get up     pt is taking warfarin due to hx of atrial fib.    Now,  pt states, She is unsure,  when she last took her meds.  or  when she fell or how  long she  was in her  bath tub (05 Apr 2021 15:25)  ---- As Above ------------------------------------ Patient seen earlier today  The patient is a poor historian. Patient denies melena, hematochezia, hematemesis, nausea, vomiting, abdominal pain, constipation, diarrhea, or change in bowel movements  Last colonoscopy ? Patient on Wafarin and denies NSAIDs.  Patient noted to have rhabdo and JOAQUIN on admission.     1)Fecal impaction by CT scan - patient refused rectal exam- will discuss with patient in AM  2) Anemia - On coumadin. No signs of melena, hematochezia or hematemsis 1) Blood work ordered.  3) Liver lesions, Lymphadenopathy - 1)  IV CT Scan VS MRI 2) CEA   4) Elevated LFTs - OT > PT , Improving probably secondary to Rhabdo. See CT scan results1) f/u labs 2) f/u work up if no further improvement

## 2021-04-06 NOTE — CHART NOTE - NSCHARTNOTEFT_GEN_A_CORE
Patient consistently refused amidst efforts to motivate c KAT Olivier in room. Will re-attempt as able. Patient also repeatedly verbalized: "I want to kill myself!". KAT Olivier aware and witnessed same.  Carolynn aware.
Medicine PA Note    Asked by Dr Garibay to perform fecal disimpaction due to result of CT scan . Spoke with patient at bedside and explained risks and benefits of procedure. Patient verbalized that she understood the risks that she might become so impacted that she will vomit fecal material..  " Patient stated that no one is doing anything to her backside" .  Informed Dr Garibay  that patient refused which was witnessed by Jaclyn Covarrubias. Will order enema .     [facility]

## 2021-04-06 NOTE — PROGRESS NOTE ADULT - ASSESSMENT
70 female with JOAQUIN on CKD due to urinary retention.   Possible post renal complicated by pre renal azotemia.  Will continue the IVF at the present rate.   will continue the renvela for hyperphosphatemia.

## 2021-04-06 NOTE — PROVIDER CONTACT NOTE (CRITICAL VALUE NOTIFICATION) - ACTION/TREATMENT ORDERED:
Will inform primary RN. for f/u Will inform primary RN. for f/u. Will continue to monitor pt. PA informed

## 2021-04-06 NOTE — CONSULT NOTE ADULT - SUBJECTIVE AND OBJECTIVE BOX
HPI:       70 years old female      arrived  via   ems from home       pt was found in the bathtub, pt  is not  a good  historian and  pt told  er,, she  was  in  her bath tub for   x 4 days, pt found   with fecal material       Pt is alert and follows verbal commends         Pt is oriented x 3, an  sts she was unable to get up     pt is taking warfarin due to hx of atrial fib.    Now,  pt states, She is unsure,  when she last took her meds.  or  when she fell or how  long she  was in her  bath tub (2021 15:25)      PAST MEDICAL & SURGICAL HISTORY:  No pertinent past medical history    Dyspnea    Malaise and fatigue    Anxiety    UTI (urinary tract infection)    Gastritis    Colitis    Asthma    Dorsalgia    Pulmonary fibrosis    Atrial fibrillation    No significant past surgical history        MEDICATIONS  (STANDING):  bisacodyl 5 milliGRAM(s) Oral every 12 hours  cefTRIAXone   IVPB 1000 milliGRAM(s) IV Intermittent every 24 hours  chlorhexidine 2% Cloths 1 Application(s) Topical <User Schedule>  midodrine. 10 milliGRAM(s) Oral three times a day  polyethylene glycol 3350 17 Gram(s) Oral daily  senna 2 Tablet(s) Oral at bedtime  sevelamer carbonate 800 milliGRAM(s) Oral three times a day with meals  sodium chloride 0.9%. 1000 milliLiter(s) (100 mL/Hr) IV Continuous <Continuous>    MEDICATIONS  (PRN):  oxycodone    5 mG/acetaminophen 325 mG 1 Tablet(s) Oral every 6 hours PRN Moderate Pain (4 - 6)      Allergies    penicillin (Seizure)  shellfish (Swelling; Short breath)  Sulfur (Hives)    Intolerances        FAMILY HISTORY:      REVIEW OF SYSTEMS:    CONSTITUTIONAL: No fever, weight loss, or fatigue  EYES: No eye pain, visual disturbances, or discharge  ENMT:  No difficulty hearing, tinnitus, vertigo; No sinus or throat pain  NECK: No pain or stiffness  BREASTS: No pain, masses, or nipple discharge  RESPIRATORY: No cough, wheezing, chills or hemoptysis; No shortness of breath  CARDIOVASCULAR: No chest pain, palpitations, dizziness, or leg swelling  GASTROINTESTINAL: No abdominal or epigastric pain. No nausea, vomiting, or hematemesis; No diarrhea or constipation. No melena or hematochezia.  GENITOURINARY: No dysuria, frequency, hematuria, or incontinence  NEUROLOGICAL: No headaches, memory loss, loss of strength, numbness, or tremors  SKIN: No itching, burning, rashes, or lesions   LYMPH NODES: No enlarged glands  ENDOCRINE: No heat or cold intolerance; No hair loss  MUSCULOSKELETAL: No joint pain or swelling; No muscle, back, or extremity pain  PSYCHIATRIC: No depression, anxiety, mood swings, or difficulty sleeping  HEME/LYMPH: No easy bruising, or bleeding gums  ALLERGY AND IMMUNOLOGIC: No hives or eczema          SOCIAL HISTORY:    FAMILY HISTORY:      Vital Signs Last 24 Hrs  T(C): 36.1 (2021 18:16), Max: 36.9 (2021 04:55)  T(F): 97 (2021 18:16), Max: 98.5 (2021 04:55)  HR: 60 (2021 20:05) (58 - 66)  BP: 151/83 (2021 20:05) (91/56 - 151/83)  BP(mean): --  RR: 18 (2021 20:05) (18 - 20)  SpO2: 100% (2021 20:05) (99% - 100%)    PHYSICAL EXAM:    GENERAL: NAD, well-groomed, well-developed  HEAD:  Atraumatic, Normocephalic  EYES: EOMI, PERRLA, conjunctiva and sclera clear  ENMT: No tonsillar erythema, exudates, or enlargement; Moist mucous membranes, Good dentition, No lesions  NECK: Supple, No JVD, Normal thyroid  NERVOUS SYSTEM:  Alert & Oriented X3, Good concentration; Motor Strength 5/5 B/L upper and lower extremities; DTRs 2+ intact and symmetric  CHEST/LUNG: Clear to percussion bilaterally; No rales, rhonchi, wheezing, or rubs  HEART: Regular rate and rhythm; No murmurs, rubs, or gallops  ABDOMEN: Soft, Nontender, Nondistended; Bowel sounds present  EXTREMITIES:  2+ Peripheral Pulses, No clubbing, cyanosis, or edema  LYMPH: No lymphadenopathy noted   RECTAL: No masses, prostate normal size and smooth, Guaiaci negative   BREAST: No palpable masses, skin no lesions no retractions, no discharges. adnexal no palpable masses noted   GYN: uterus normal size, adnexal, no palpable masses, no CMT, no uterine discharge   SKIN: No rashes or lesions    LABS:                        8.0    8.53  )-----------( 245      ( 2021 15:53 )             26.4       CBC:  04- @ 15:53  WBC  8.53  HGB 8.0  HCT 26.4 Plate 245  MCV 90.1  04- @ 10:32  WBC  7.16  HGB 8.0  HCT 26.2 Plate 238  MCV 89.1  04- @ 07:27  WBC  7.57  HGB 7.9  HCT 26.8 Plate 250  MCV 90.5  04- @ 02:50  WBC  8.77  HGB 8.1  HCT 27.3 Plate 240  MCV 91.3  04-05 @ 22:39  WBC  11.04  HGB 8.7  HCT 28.7 Plate 277  MCV 90.0  04-05 @ 21:43  WBC  11.26  HGB 8.2  HCT 27.0 Plate 272  MCV 88.8  - @ 18:58  WBC  11.77  HGB 8.9  HCT 29.5 Plate 281  MCV 91.0   @ 16:36  WBC  11.74  HGB 8.8  HCT 29.1 Plate 270  MCV 90.1  04- @ 11:01  WBC  13.08  HGB 10.5  HCT 34.9 Plate 347  MCV 90.2           2021 07:27    140    |  108    |  74     ----------------------------<  79     4.2     |  19     |  4.20   2021 22:40    137    |  106    |  76     ----------------------------<  89     4.8     |  19     |  4.87   2021 16:36    135    |  104    |  77     ----------------------------<  96     5.9     |  19     |  5.11   2021 11:01    135    |  98     |  77     ----------------------------<  103    5.7     |  20     |  5.74     Ca    6.9        2021 07:27  Ca    7.1        2021 22:40  Ca    6.9        2021 16:36  Ca    8.0        2021 11:01  Phos  7.3       2021 07:27  Phos  7.3       2021 22:40  Phos  7.6       2021 16:36  Mg     4.4       2021 07:27  Mg     4.8       2021 22:40  Mg     5.1       2021 16:36  Mg     5.5       2021 11:01    TPro  5.2    /  Alb  1.8    /  TBili  0.5    /  DBili  .26    /  AST  98     /  ALT  34     /  AlkPhos  134    2021 07:27  TPro  7.5    /  Alb  2.6    /  TBili  0.6    /  DBili  x      /  AST  109    /  ALT  34     /  AlkPhos  194    2021 11:01    PT/INR - ( 2021 07:27 )   PT: 24.0 sec;   INR: 2.15 ratio         PTT - ( 2021 11:01 )  PTT:39.9 sec  Urinalysis Basic - ( 2021 15:45 )    Color: Yellow / Appearance: very cloudy / S.015 / pH: x  Gluc: x / Ketone: Negative  / Bili: Small / Urobili: Negative mg/dL   Blood: x / Protein: 30 mg/dL / Nitrite: Negative   Leuk Esterase: Moderate / RBC: 6-10 /HPF / WBC 11-25   Sq Epi: x / Non Sq Epi: x / Bacteria: Few          RADIOLOGY & ADDITIONAL STUDIES: HPI:       70 years old female      arrived  via   ems from home       pt was found in the bathtub, pt  is not  a good  historian and  pt told  er,, she  was  in  her bath tub for   x 4 days, pt found   with fecal material       Pt is alert and follows verbal commends         Pt is oriented x 3, an  sts she was unable to get up     pt is taking warfarin due to hx of atrial fib.    Now,  pt states, She is unsure,  when she last took her meds.  or  when she fell or how  long she  was in her  bath tub (2021 15:25)  ---- As Above ------------------------------------ Patient seen earlier today  The patient is a poor historian. Patient denies melena, hematochezia, hematemesis, nausea, vomiting, abdominal pain, constipation, diarrhea, or change in bowel movements  Last colonoscopy ? Patient on Wafarin and denies NSAIDs.  Patient noted to have rhabdo and JOAQUIN on admission.     PAST MEDICAL & SURGICAL HISTORY:  No pertinent past medical history    Dyspnea    Malaise and fatigue    Anxiety    UTI (urinary tract infection)    Gastritis    Colitis    Asthma    Dorsalgia    Pulmonary fibrosis    Atrial fibrillation    No significant past surgical history        MEDICATIONS  (STANDING):  bisacodyl 5 milliGRAM(s) Oral every 12 hours  cefTRIAXone   IVPB 1000 milliGRAM(s) IV Intermittent every 24 hours  chlorhexidine 2% Cloths 1 Application(s) Topical <User Schedule>  midodrine. 10 milliGRAM(s) Oral three times a day  polyethylene glycol 3350 17 Gram(s) Oral daily  senna 2 Tablet(s) Oral at bedtime  sevelamer carbonate 800 milliGRAM(s) Oral three times a day with meals  sodium chloride 0.9%. 1000 milliLiter(s) (100 mL/Hr) IV Continuous <Continuous>    MEDICATIONS  (PRN):  oxycodone    5 mG/acetaminophen 325 mG 1 Tablet(s) Oral every 6 hours PRN Moderate Pain (4 - 6)      Allergies    penicillin (Seizure)  shellfish (Swelling; Short breath)  Sulfur (Hives)    Intolerances        FAMILY HISTORY:      REVIEW OF SYSTEMS:    CONSTITUTIONAL: No fever, weight loss,  EYES: No eye pain, visual disturbances, or discharge  ENMT:  No difficulty hearing, tinnitus, vertigo; No sinus or throat pain  NECK: No pain or stiffness  BREASTS: No pain, masses, or nipple discharge  RESPIRATORY: No cough, wheezing, chills or hemoptysis; No shortness of breath  CARDIOVASCULAR: No chest pain, palpitations, dizziness, or leg swelling  GASTROINTESTINAL: See above  GENITOURINARY: No dysuria, frequency, hematuria, or incontinence  NEUROLOGICAL: No headaches, numbness, or tremors  SKIN: No itching, burning, rashes, or lesions   LYMPH NODES: No enlarged glands  ENDOCRINE: No heat or cold intolerance; No hair loss  MUSCULOSKELETAL: No joint pain or swelling; No muscle, back, or extremity pain  PSYCHIATRIC: No depression, anxiety, mood swings, or difficulty sleeping  HEME/LYMPH: No easy bruising, or bleeding gums  ALLERGY AND IMMUNOLOGIC: No hives or eczema          SOCIAL HISTORY:    FAMILY HISTORY:      Vital Signs Last 24 Hrs  T(C): 36.1 (2021 18:16), Max: 36.9 (2021 04:55)  T(F): 97 (2021 18:16), Max: 98.5 (2021 04:55)  HR: 60 (2021 20:05) (58 - 66)  BP: 151/83 (2021 20:05) (91/56 - 151/83)  BP(mean): --  RR: 18 (2021 20:05) (18 - 20)  SpO2: 100% (2021 20:05) (99% - 100%)    PHYSICAL EXAM:    GENERAL: NAD, well-groomed, well-developed  HEAD:  Atraumatic, Normocephalic  EYES: EOMI, PERRLA, conjunctiva and sclera clear  NECK: Supple, No JVD, Normal thyroid  NERVOUS SYSTEM:  Lethargic  CHEST/LUNG: Clear to percussion bilaterally; No rales, rhonchi, wheezing, or rubs  HEART: Regular rate and rhythm; No murmurs, rubs, or gallops  ABDOMEN: Soft, Nontender, Nondistended; Bowel sounds present  EXTREMITIES:  2+ Peripheral Pulses, No clubbing, cyanosis, or edema  LYMPH: No lymphadenopathy noted   RECTAL: Patient refused   SKIN: No rashes or lesions    LABS:                        8.0    8.53  )-----------( 245      ( 2021 15:53 )             26.4       CBC:  04-06 @ 15:53  WBC  8.53  HGB 8.0  HCT 26.4 Plate 245  MCV 90.1  04-06 @ 10:32  WBC  7.16  HGB 8.0  HCT 26.2 Plate 238  MCV 89.1  04-06 @ 07:27  WBC  7.57  HGB 7.9  HCT 26.8 Plate 250  MCV 90.5  04-06 @ 02:50  WBC  8.77  HGB 8.1  HCT 27.3 Plate 240  MCV 91.3  04-05 @ 22:39  WBC  11.04  HGB 8.7  HCT 28.7 Plate 277  MCV 90.0  04-05 @ 21:43  WBC  11.26  HGB 8.2  HCT 27.0 Plate 272  MCV 88.8  04-05 @ 18:58  WBC  11.77  HGB 8.9  HCT 29.5 Plate 281  MCV 91.0  04-05 @ 16:36  WBC  11.74  HGB 8.8  HCT 29.1 Plate 270  MCV 90.1  04-05 @ 11:01  WBC  13.08  HGB 10.5  HCT 34.9 Plate 347  MCV 90.2           2021 07:27    140    |  108    |  74     ----------------------------<  79     4.2     |  19     |  4.20   2021 22:40    137    |  106    |  76     ----------------------------<  89     4.8     |  19     |  4.87   2021 16:36    135    |  104    |  77     ----------------------------<  96     5.9     |  19     |  5.11   2021 11:01    135    |  98     |  77     ----------------------------<  103    5.7     |  20     |  5.74     Ca    6.9        2021 07:27  Ca    7.1        2021 22:40  Ca    6.9        2021 16:36  Ca    8.0        2021 11:01  Phos  7.3       2021 07:27  Phos  7.3       2021 22:40  Phos  7.6       2021 16:36  Mg     4.4       2021 07:27  Mg     4.8       2021 22:40  Mg     5.1       2021 16:36  Mg     5.5       2021 11:01    TPro  5.2    /  Alb  1.8    /  TBili  0.5    /  DBili  .26    /  AST  98     /  ALT  34     /  AlkPhos  134    2021 07:27  TPro  7.5    /  Alb  2.6    /  TBili  0.6    /  DBili  x      /  AST  109    /  ALT  34     /  AlkPhos  194    2021 11:01    PT/INR - ( 2021 07:27 )   PT: 24.0 sec;   INR: 2.15 ratio         PTT - ( 2021 11:01 )  PTT:39.9 sec  Urinalysis Basic - ( 2021 15:45 )    Color: Yellow / Appearance: very cloudy / S.015 / pH: x  Gluc: x / Ketone: Negative  / Bili: Small / Urobili: Negative mg/dL   Blood: x / Protein: 30 mg/dL / Nitrite: Negative   Leuk Esterase: Moderate / RBC: 6-10 /HPF / WBC 11-25   Sq Epi: x / Non Sq Epi: x / Bacteria: Few          RADIOLOGY & ADDITIONAL STUDIES:  < from: CT Renal Stone Hunt (21 @ 14:29) >    EXAM:  CT RENAL STONE HUNT                            PROCEDURE DATE:  2021          INTERPRETATION:  CLINICAL INDICATION: 70 years  Female with elevated bun/cr.    COMPARISON: None.    CONTRAST/COMPLICATIONS:  IV Contrast: None  Oral Contrast: None  Complications: None reported at study completion    PROCEDURE:  CT of the Abdomen and Pelvis was performed.  Sagittal and coronal reformats were performed.    LIMITATIONS: Evaluation of the solid organs, vascular structures and GI tract is limited without oral and IV contrast. Motion artifact. Streak artifact from patient's adjacent arms.    FINDINGS:  LOWER CHEST: Bilateral lower lobe scarring. Mild cardiomegaly.    LIVER: Heterogeneous parenchyma limiting evaluation for underlying mass.  BILE DUCTS: Normal caliber.  GALLBLADDER: Mildly distended. No cholelithiasis identified.  SPLEEN: Within normal limits.  PANCREAS: Within normal limits.  ADRENALS: Within normal limits.  KIDNEYS/URETERS: 3 mm left renal angiomyolipoma. Mild bilateral hydroureteronephrosis. No calculi identified.    BLADDER: Markedly distended measuring 19.3 cm sagittal and extending to the level of the umbilicus.  REPRODUCTIVE ORGANS: Hysterectomy.    BOWEL: No bowel obstruction. Appendix is not visualized. No evidence of inflammation in the pericecal region.. 16.5 cm long segment of impacted rectosigmoid colon measuring up to 8.8 cm in caliber.  PERITONEUM: No ascites.  VESSELS: Within normal limits.  RETROPERITONEUM/LYMPH NODES: Enlarged periaortic lymph nodes with reference lymph nodes measuring 2.1 x 1.8 cm (15:62). Bilateral external iliac adenopathy. Reference left-sided lymph nodes measuring 1.7 x 1.1 cm (15:100) and 2.0 x 2.9 cm (15:102)..  ABDOMINAL WALL: Within normal limits.  BONES: Within normal limits.    IMPRESSION:    Heterogeneous liver suspicious for metastatic disease. Recommend follow-up ultrasound, contrast enhanced CT or MRI.    Retroperitoneal and pelvic adenopathy suspicious for metastatic disease.    Marked bladder distentionwith mild bilateral hydroureteronephrosis.    Impacted rectosigmoid colon may contribute to bladder outlet obstruction.                  EL BLACKWOOD MD; Attending Radiologist  This document has been electronically signed. 2021  4:23PM    < end of copied text >

## 2021-04-06 NOTE — PROGRESS NOTE ADULT - ASSESSMENT
70F with Atrial Fibrillation, and HTN admitted for Fall and Rhabdomyolysis     Rhabdomyolysis   Continue IVF and trend CK   Renal Function appears to be worsening but making urine    Syncope / Atrial Fibrillation  Telemetry monitoring   Rate controlled; Holding Digoxin and Cardizem for now due to low BP  Warfarin for AC and increased risk of DVT and PE due to possible Malignancy     Acute Renal Failure  Baseline Creatinine unknown   Worsened most likely from underlying Rhabdomyolysis and possible obstruction from compression of impacted stool in rectum   Marquez Inserted   Continue IVF and renally dose all medications   Nephrology on board     ? Malignancy   Imaging reviewed   Will need Oncoly Consultation when more stable    Sacral Ulcer  Wound care    Diet  Regular

## 2021-04-07 LAB
ANION GAP SERPL CALC-SCNC: 11 MMOL/L — SIGNIFICANT CHANGE UP (ref 5–17)
BUN SERPL-MCNC: 53 MG/DL — HIGH (ref 7–23)
CALCIUM SERPL-MCNC: 7 MG/DL — LOW (ref 8.5–10.1)
CHLORIDE SERPL-SCNC: 107 MMOL/L — SIGNIFICANT CHANGE UP (ref 96–108)
CK SERPL-CCNC: 411 U/L — HIGH (ref 26–192)
CO2 SERPL-SCNC: 22 MMOL/L — SIGNIFICANT CHANGE UP (ref 22–31)
CREAT SERPL-MCNC: 2.49 MG/DL — HIGH (ref 0.5–1.3)
GLUCOSE SERPL-MCNC: 76 MG/DL — SIGNIFICANT CHANGE UP (ref 70–99)
HCT VFR BLD CALC: 24.3 % — LOW (ref 34.5–45)
HGB BLD-MCNC: 7.4 G/DL — LOW (ref 11.5–15.5)
INR BLD: 2.73 RATIO — HIGH (ref 0.88–1.16)
MCHC RBC-ENTMCNC: 27 PG — SIGNIFICANT CHANGE UP (ref 27–34)
MCHC RBC-ENTMCNC: 30.5 GM/DL — LOW (ref 32–36)
MCV RBC AUTO: 88.7 FL — SIGNIFICANT CHANGE UP (ref 80–100)
NRBC # BLD: 0 /100 WBCS — SIGNIFICANT CHANGE UP (ref 0–0)
PLATELET # BLD AUTO: 220 K/UL — SIGNIFICANT CHANGE UP (ref 150–400)
POTASSIUM SERPL-MCNC: 3 MMOL/L — LOW (ref 3.5–5.3)
POTASSIUM SERPL-SCNC: 3 MMOL/L — LOW (ref 3.5–5.3)
PROTHROM AB SERPL-ACNC: 30.2 SEC — HIGH (ref 10.6–13.6)
RBC # BLD: 2.74 M/UL — LOW (ref 3.8–5.2)
RBC # FLD: 22.6 % — HIGH (ref 10.3–14.5)
SODIUM SERPL-SCNC: 140 MMOL/L — SIGNIFICANT CHANGE UP (ref 135–145)
WBC # BLD: 5.15 K/UL — SIGNIFICANT CHANGE UP (ref 3.8–10.5)
WBC # FLD AUTO: 5.15 K/UL — SIGNIFICANT CHANGE UP (ref 3.8–10.5)

## 2021-04-07 PROCEDURE — 90792 PSYCH DIAG EVAL W/MED SRVCS: CPT

## 2021-04-07 PROCEDURE — 99233 SBSQ HOSP IP/OBS HIGH 50: CPT

## 2021-04-07 RX ORDER — ALPRAZOLAM 0.25 MG
0.5 TABLET ORAL
Refills: 0 | Status: DISCONTINUED | OUTPATIENT
Start: 2021-04-07 | End: 2021-04-10

## 2021-04-07 RX ORDER — LACTULOSE 10 G/15ML
10 SOLUTION ORAL
Refills: 0 | Status: DISCONTINUED | OUTPATIENT
Start: 2021-04-07 | End: 2021-04-07

## 2021-04-07 RX ORDER — ACETAMINOPHEN WITH CODEINE 300MG-30MG
1 TABLET ORAL EVERY 6 HOURS
Refills: 0 | Status: DISCONTINUED | OUTPATIENT
Start: 2021-04-07 | End: 2021-04-10

## 2021-04-07 RX ORDER — LACTULOSE 10 G/15ML
10 SOLUTION ORAL
Refills: 0 | Status: DISCONTINUED | OUTPATIENT
Start: 2021-04-08 | End: 2021-04-08

## 2021-04-07 RX ORDER — ACETAMINOPHEN WITH CODEINE 300MG-30MG
10 TABLET ORAL EVERY 6 HOURS
Refills: 0 | Status: DISCONTINUED | OUTPATIENT
Start: 2021-04-07 | End: 2021-04-07

## 2021-04-07 RX ORDER — MULTIVIT WITH MIN/MFOLATE/K2 340-15/3 G
1 POWDER (GRAM) ORAL ONCE
Refills: 0 | Status: COMPLETED | OUTPATIENT
Start: 2021-04-07 | End: 2021-04-07

## 2021-04-07 RX ORDER — POTASSIUM CHLORIDE 20 MEQ
40 PACKET (EA) ORAL
Refills: 0 | Status: DISCONTINUED | OUTPATIENT
Start: 2021-04-07 | End: 2021-04-08

## 2021-04-07 RX ADMIN — MIDODRINE HYDROCHLORIDE 10 MILLIGRAM(S): 2.5 TABLET ORAL at 06:53

## 2021-04-07 RX ADMIN — SEVELAMER CARBONATE 800 MILLIGRAM(S): 2400 POWDER, FOR SUSPENSION ORAL at 13:36

## 2021-04-07 RX ADMIN — CHLORHEXIDINE GLUCONATE 1 APPLICATION(S): 213 SOLUTION TOPICAL at 06:52

## 2021-04-07 RX ADMIN — Medication 5 MILLIGRAM(S): at 06:51

## 2021-04-07 RX ADMIN — OXYCODONE AND ACETAMINOPHEN 1 TABLET(S): 5; 325 TABLET ORAL at 15:06

## 2021-04-07 RX ADMIN — SEVELAMER CARBONATE 800 MILLIGRAM(S): 2400 POWDER, FOR SUSPENSION ORAL at 08:59

## 2021-04-07 RX ADMIN — CEFTRIAXONE 100 MILLIGRAM(S): 500 INJECTION, POWDER, FOR SOLUTION INTRAMUSCULAR; INTRAVENOUS at 17:42

## 2021-04-07 RX ADMIN — SODIUM CHLORIDE 100 MILLILITER(S): 9 INJECTION INTRAMUSCULAR; INTRAVENOUS; SUBCUTANEOUS at 13:44

## 2021-04-07 RX ADMIN — MIDODRINE HYDROCHLORIDE 10 MILLIGRAM(S): 2.5 TABLET ORAL at 17:41

## 2021-04-07 RX ADMIN — SEVELAMER CARBONATE 800 MILLIGRAM(S): 2400 POWDER, FOR SUSPENSION ORAL at 17:41

## 2021-04-07 RX ADMIN — MIDODRINE HYDROCHLORIDE 10 MILLIGRAM(S): 2.5 TABLET ORAL at 13:36

## 2021-04-07 RX ADMIN — POLYETHYLENE GLYCOL 3350 17 GRAM(S): 17 POWDER, FOR SOLUTION ORAL at 13:34

## 2021-04-07 RX ADMIN — Medication 1 BOTTLE: at 13:36

## 2021-04-07 NOTE — BH CONSULTATION LIAISON ASSESSMENT NOTE - NSBHCHARTREVIEWLAB_PSY_A_CORE FT
04-07    140  |  107  |  53<H>  ----------------------------<  76  3.0<L>   |  22  |  2.49<H>    Ca    7.0<L>      07 Apr 2021 07:40  Phos  7.3     04-06  Mg     4.4     04-06    TPro  5.2<L>  /  Alb  1.8<L>  /  TBili  0.5  /  DBili  .26<H>  /  AST  98<H>  /  ALT  34  /  AlkPhos  134<H>  04-06

## 2021-04-07 NOTE — DIETITIAN INITIAL EVALUATION ADULT. - PERTINENT LABORATORY DATA
04-07 Na140 mmol/L Glu 76 mg/dL K+ 3.0 mmol/L<L> Cr  2.49 mg/dL<H> BUN 53 mg/dL<H> 04-06 Phos 7.3 mg/dL<H> 04-06 Alb 1.8 g/dL<L>  04-06-21 A1C 4.8

## 2021-04-07 NOTE — BH CONSULTATION LIAISON ASSESSMENT NOTE - NSBHCHARTREVIEWVS_PSY_A_CORE FT
Vital Signs Last 24 Hrs  T(C): 36.4 (07 Apr 2021 10:21), Max: 36.5 (07 Apr 2021 00:32)  T(F): 97.5 (07 Apr 2021 10:21), Max: 97.7 (07 Apr 2021 00:32)  HR: 68 (07 Apr 2021 10:21) (60 - 72)  BP: 105/61 (07 Apr 2021 10:21) (91/56 - 151/83)  BP(mean): --  RR: 18 (07 Apr 2021 10:21) (18 - 18)  SpO2: 99% (07 Apr 2021 10:21) (98% - 100%)

## 2021-04-07 NOTE — BH CONSULTATION LIAISON ASSESSMENT NOTE - NSICDXPASTMEDICALHX_GEN_ALL_CORE_FT
PAST MEDICAL HISTORY:  Anxiety     Asthma     Atrial fibrillation     Colitis     Dorsalgia     Dyspnea     Gastritis     Malaise and fatigue     No pertinent past medical history     Pulmonary fibrosis     UTI (urinary tract infection)

## 2021-04-07 NOTE — DIETITIAN INITIAL EVALUATION ADULT. - OTHER INFO
Was able to interview pt but not sure how much information she provided was accurate; seemed paranoid; reports staff trying to hurt her; that they dropped her at CT; wants to sign out AMA.  Per medical record pt lives alone; found in bathtub in feces; claims she is independent but per APS (pt known to them) it is not safe for her to return home; will attempt for rehab placement rolled over to [facility].  CT reveals metastatic disease for which pt is refusing further work-up.  Denies any N/V/C/D or chew/swallowing difficulty; reports wt stable & appetite is usually good.

## 2021-04-07 NOTE — DIETITIAN INITIAL EVALUATION ADULT. - OTHER CALCULATIONS
Ht (cm):  172.72     Wt (kg):   113.6 (4/6)    BMI:  38       IBW:  63.6 kg   %IBW:  179%   UBW: stable   %UBW: 100%

## 2021-04-07 NOTE — BH CONSULTATION LIAISON ASSESSMENT NOTE - DETAILS
reports fleeting thoughts that life is not worth living when in physical pain/distress as there is no quality to life then. Denies intent, plan

## 2021-04-07 NOTE — PROGRESS NOTE ADULT - ASSESSMENT
70F with Atrial Fibrillation, and HTN admitted for Fall and Rhabdomyolysis     Rhabdomyolysis   Continue IVF and trend CK   Renal Function improving now     Syncope / Atrial Fibrillation  Rate controlled; Holding Digoxin and Cardizem for now due to low BP  Warfarin for AC and increased risk of DVT and PE due to possible Malignancy , hold tonights dose     Acute Renal Failure  Baseline Creatinine unknown   Worsened most likely from underlying Rhabdomyolysis and possible obstruction from compression of impacted stool in rectum   Marquez Inserted   Continue IVF and renally dose all medications   Nephrology on board     replete hypokalemia     ? Malignancy   Imaging reviewed   Will need Oncoly Consultation when more stable  pt refusing work up now     Will Have psych eval    Anemia. pt refusing blood for now, will monitor psych eval     Sacral Ulcer  Wound care    Diet  Regular           70F with Atrial Fibrillation, and HTN admitted for Fall and Rhabdomyolysis     Rhabdomyolysis   Continue IVF and trend CK   Renal Function improving now     Syncope / Atrial Fibrillation  Rate controlled; Holding Digoxin and Cardizem for now due to low BP  Warfarin for AC and increased risk of DVT and PE due to possible Malignancy , hold tonights dose     Acute Renal Failure  Baseline Creatinine unknown   Worsened most likely from underlying Rhabdomyolysis and possible obstruction from compression of impacted stool in rectum   Marquez Inserted   Continue IVF and renally dose all medications   Nephrology on board     replete hypokalemia       Gram neg bacteremia-proteus  -likely 2/2 uti, c/w cftx, f/u cultures     ? Malignancy   Imaging reviewed   Will need Oncoly Consultation when more stable  pt refusing work up now     Will Have psych eval    Anemia. pt refusing blood for now, will monitor psych eval     Sacral Ulcer  Wound care    Diet  Regular

## 2021-04-07 NOTE — BH CONSULTATION LIAISON ASSESSMENT NOTE - SUMMARY
clinical presentation and history most consistent with a bout of delirium (uremic, dehydration etc) which should clear in a few days

## 2021-04-07 NOTE — BH CONSULTATION LIAISON ASSESSMENT NOTE - CURRENT MEDICATION
MEDICATIONS  (STANDING):  cefTRIAXone   IVPB 1000 milliGRAM(s) IV Intermittent every 24 hours  chlorhexidine 2% Cloths 1 Application(s) Topical <User Schedule>  midodrine. 10 milliGRAM(s) Oral three times a day  polyethylene glycol 3350 17 Gram(s) Oral daily  senna 2 Tablet(s) Oral at bedtime  sevelamer carbonate 800 milliGRAM(s) Oral three times a day with meals  sodium chloride 0.9%. 1000 milliLiter(s) (100 mL/Hr) IV Continuous <Continuous>    MEDICATIONS  (PRN):  oxycodone    5 mG/acetaminophen 325 mG 1 Tablet(s) Oral every 6 hours PRN Moderate Pain (4 - 6)

## 2021-04-07 NOTE — BH CONSULTATION LIAISON ASSESSMENT NOTE - NSBHCONSULTRECOMMENDOTHER_PSY_A_CORE FT
cannot leave AMA  - Discontinued Percocet and ordered Tylenol #3 (do not have #3 in pharmacy) as per Pt's request   - Xanax 0.5mg PO bid prn ordered as Pt uses it at home / ISTOP confirmed   - hydration, supportive care, medical optimization

## 2021-04-07 NOTE — PROGRESS NOTE ADULT - ASSESSMENT
HPI:       70 years old female      arrived  via   ems from home       pt was found in the bathtub, pt  is not  a good  historian and  pt told  er,, she  was  in  her bath tub for   x 4 days, pt found   with fecal material       Pt is alert and follows verbal commends         Pt is oriented x 3, an  sts she was unable to get up     pt is taking warfarin due to hx of atrial fib.    Now,  pt states, She is unsure,  when she last took her meds.  or  when she fell or how  long she  was in her  bath tub (05 Apr 2021 15:25)  ---- As Above ------------------------------------ Patient seen earlier today  The patient is a poor historian. Patient denies melena, hematochezia, hematemesis, nausea, vomiting, abdominal pain, constipation, diarrhea, or change in bowel movements  Last colonoscopy ? Patient on Wafarin and denies NSAIDs.  Patient noted to have rhabdo and JOAQUIN on admission.     1)Fecal impaction by CT scan - patient refused rectal exam- s/p enema --> 5 BMs 1) Lactulose BID 2) D/C bisacodyl 3) Mag citrate x 1  2) Anemia - 7.4  On coumadin. No signs of melena, hematochezia or hematemsis 1) Blood work ordered. 2) Patient refusing GI work up - Discussed for 5 minutes  3) Liver lesions, Lymphadenopathy - 1)  IV CT Scan  ( Cr 2.49 ) VS MRI ( Patient refusing ) - discussed for 5 minutes  2) CEA   4) Elevated LFTs - OT > PT , Improving probably secondary to Rhabdo. See CT scan results1) f/u labs 2) f/u work up if no further improvement

## 2021-04-07 NOTE — BH CONSULTATION LIAISON ASSESSMENT NOTE - HPI (INCLUDE ILLNESS QUALITY, SEVERITY, DURATION, TIMING, CONTEXT, MODIFYING FACTORS, ASSOCIATED SIGNS AND SYMPTOMS)
70 years old female, single, noncaregiver, domiciled alone, with as of yet unknown past psychiatric/medical history BIB EMS from home c/o pt was found in the bathtub x 4 days covered with fecal material; found to have severe renal failure and BP quite low despite 4 liters of IV fluid.     COLLATERAL FROM DR ROLANDO GOULD' S' OFFICE AT [phone]: confirmed that Patient is an active patient in the practice. Last seen by MD via Telehealth meeting 3/24/21 c/o constipation at that time  and was recommended MOM; seemed baseline at that time. No emergency contact was listed for Patient at the office.      ISTOP Reference #:[id number]   03/15/2021 03/15/2021 alprazolam 0.5 mg tablet  90 30 Rolando Gould Medicare [facility] [id number]   03/15/2021 03/15/2021 acetaminophen-cod #4 tablet  112 14 Rolando Gould [employer] [facility] [id number]   03/02/2021 03/02/2021 acetaminophen-cod #4 tablet  112 14 Rolando Gould [employer] [facility] [id number]   02/15/2021 02/15/2021 acetaminophen-cod #4 tablet  112 14 Rolando Gould [employer] [facility] [id number]   02/08/2021 02/13/2021 alprazolam 0.5 mg tablet  90 30 Rolando Gould [employer] [facility] [id number]    70 years old  female, single, noncaregiver, domiciled alone, with as of yet unknown past psychiatric/medical history BIB EMS from home c/o pt was found in the bathtub x 4 days covered with fecal material; found to have severe renal failure and BP quite low despite 4 liters of IV fluid. Consult called for capacity    EXAM: patient is calm, cooperative, relates in a semi-limited manner, perseverates into refusing to "be turned and have my anus wiped" due to physical pain. Patient c/o that she is not getting her pain medication here which she takes at home and it helps her (ie Tylenol with Codeine) and does not want to Percocet as it does not help. Patient  focused on this at this time and cannot answer other questions. Denies suicidality, reports being safe here.     COLLATERAL FROM DR ROLANDO GOULD' S' OFFICE AT [phone]: confirmed that Patient is an active patient in the practice. Last seen by MD via Telehealth meeting 3/24/21 c/o constipation at that time  and was recommended MOM; seemed baseline at that time. No emergency contact was listed for Patient at the office.      ISTOP Reference #:[id number]   03/15/2021 03/15/2021 alprazolam 0.5 mg tablet  90 30 Rolando Gould Medicare [facility] [id number]   03/15/2021 03/15/2021 acetaminophen-cod #4 tablet  112 14 Rolando Gould [employer] [facility] [id number]   03/02/2021 03/02/2021 acetaminophen-cod #4 tablet  112 14 Rolando Gould [employer] [facility] [id number]   02/15/2021 02/15/2021 acetaminophen-cod #4 tablet  112 14 Rolando Gould [employer] [facility] [id number]   02/08/2021 02/13/2021 alprazolam 0.5 mg tablet  90 30 Rolando Gould [employer] [facility] [id number]

## 2021-04-07 NOTE — BH CONSULTATION LIAISON ASSESSMENT NOTE - NSBHCHARTREVIEWINVESTIGATE_PSY_A_CORE FT
QTc 373  CT Head No Cont (04.05.21) Mild to moderate enlargement of ventricles and sulci consistent with volume loss. There are hemispheric white matter areas of low attenuation which are nonspecific but likely related to sequelae of microvascular disease with age-indeterminate lacunar infarcts.    < end of copied text >

## 2021-04-07 NOTE — BH CONSULTATION LIAISON ASSESSMENT NOTE - NSSUICPROTFACT_PSY_ALL_CORE
Identifies reasons for living/Fear of death or the actual act of killing self/Cultural, spiritual and/or moral attitudes against suicide/Positive therapeutic relationships/Christianity beliefs

## 2021-04-08 LAB
-  AMIKACIN: SIGNIFICANT CHANGE UP
-  AMPICILLIN/SULBACTAM: SIGNIFICANT CHANGE UP
-  AMPICILLIN: SIGNIFICANT CHANGE UP
-  AZTREONAM: SIGNIFICANT CHANGE UP
-  CEFAZOLIN: SIGNIFICANT CHANGE UP
-  CEFEPIME: SIGNIFICANT CHANGE UP
-  CEFOXITIN: SIGNIFICANT CHANGE UP
-  CEFTRIAXONE: SIGNIFICANT CHANGE UP
-  CIPROFLOXACIN: SIGNIFICANT CHANGE UP
-  ERTAPENEM: SIGNIFICANT CHANGE UP
-  GENTAMICIN: SIGNIFICANT CHANGE UP
-  LEVOFLOXACIN: SIGNIFICANT CHANGE UP
-  MEROPENEM: SIGNIFICANT CHANGE UP
-  PIPERACILLIN/TAZOBACTAM: SIGNIFICANT CHANGE UP
-  TOBRAMYCIN: SIGNIFICANT CHANGE UP
-  TRIMETHOPRIM/SULFAMETHOXAZOLE: SIGNIFICANT CHANGE UP
ALBUMIN SERPL ELPH-MCNC: 1.5 G/DL — LOW (ref 3.3–5)
ALP SERPL-CCNC: 174 U/L — HIGH (ref 40–120)
ALT FLD-CCNC: 33 U/L — SIGNIFICANT CHANGE UP (ref 12–78)
ANION GAP SERPL CALC-SCNC: 8 MMOL/L — SIGNIFICANT CHANGE UP (ref 5–17)
AST SERPL-CCNC: 43 U/L — HIGH (ref 15–37)
BILIRUB SERPL-MCNC: 0.4 MG/DL — SIGNIFICANT CHANGE UP (ref 0.2–1.2)
BUN SERPL-MCNC: 30 MG/DL — HIGH (ref 7–23)
CALCIUM SERPL-MCNC: 6.8 MG/DL — LOW (ref 8.5–10.1)
CHLORIDE SERPL-SCNC: 109 MMOL/L — HIGH (ref 96–108)
CK SERPL-CCNC: 218 U/L — HIGH (ref 26–192)
CO2 SERPL-SCNC: 25 MMOL/L — SIGNIFICANT CHANGE UP (ref 22–31)
CREAT SERPL-MCNC: 1.25 MG/DL — SIGNIFICANT CHANGE UP (ref 0.5–1.3)
CULTURE RESULTS: SIGNIFICANT CHANGE UP
CULTURE RESULTS: SIGNIFICANT CHANGE UP
FERRITIN SERPL-MCNC: 1063 NG/ML — HIGH (ref 15–150)
GLUCOSE SERPL-MCNC: 84 MG/DL — SIGNIFICANT CHANGE UP (ref 70–99)
GRAM STN FLD: SIGNIFICANT CHANGE UP
GRAM STN FLD: SIGNIFICANT CHANGE UP
HCT VFR BLD CALC: 25.6 % — LOW (ref 34.5–45)
HGB BLD-MCNC: 7.8 G/DL — LOW (ref 11.5–15.5)
INR BLD: 6.15 RATIO — CRITICAL HIGH (ref 0.88–1.16)
IRON SATN MFR SERPL: 15 % — SIGNIFICANT CHANGE UP (ref 14–50)
IRON SATN MFR SERPL: 18 UG/DL — LOW (ref 30–160)
MAGNESIUM SERPL-MCNC: 2.6 MG/DL — SIGNIFICANT CHANGE UP (ref 1.6–2.6)
MCHC RBC-ENTMCNC: 27.3 PG — SIGNIFICANT CHANGE UP (ref 27–34)
MCHC RBC-ENTMCNC: 30.5 GM/DL — LOW (ref 32–36)
MCV RBC AUTO: 89.5 FL — SIGNIFICANT CHANGE UP (ref 80–100)
METHOD TYPE: SIGNIFICANT CHANGE UP
NRBC # BLD: 0 /100 WBCS — SIGNIFICANT CHANGE UP (ref 0–0)
ORGANISM # SPEC MICROSCOPIC CNT: SIGNIFICANT CHANGE UP
PHOSPHATE SERPL-MCNC: 2.3 MG/DL — LOW (ref 2.5–4.5)
PLATELET # BLD AUTO: 218 K/UL — SIGNIFICANT CHANGE UP (ref 150–400)
POTASSIUM SERPL-MCNC: 3.1 MMOL/L — LOW (ref 3.5–5.3)
POTASSIUM SERPL-SCNC: 3.1 MMOL/L — LOW (ref 3.5–5.3)
PROT SERPL-MCNC: 5.2 GM/DL — LOW (ref 6–8.3)
PROTHROM AB SERPL-ACNC: 65.4 SEC — HIGH (ref 10.6–13.6)
RBC # BLD: 2.86 M/UL — LOW (ref 3.8–5.2)
RBC # FLD: 22.5 % — HIGH (ref 10.3–14.5)
SODIUM SERPL-SCNC: 142 MMOL/L — SIGNIFICANT CHANGE UP (ref 135–145)
SPECIMEN SOURCE: SIGNIFICANT CHANGE UP
SPECIMEN SOURCE: SIGNIFICANT CHANGE UP
TIBC SERPL-MCNC: 119 UG/DL — LOW (ref 220–430)
UIBC SERPL-MCNC: 101 UG/DL — LOW (ref 110–370)
WBC # BLD: 5.29 K/UL — SIGNIFICANT CHANGE UP (ref 3.8–10.5)
WBC # FLD AUTO: 5.29 K/UL — SIGNIFICANT CHANGE UP (ref 3.8–10.5)

## 2021-04-08 PROCEDURE — 99233 SBSQ HOSP IP/OBS HIGH 50: CPT

## 2021-04-08 PROCEDURE — 99231 SBSQ HOSP IP/OBS SF/LOW 25: CPT

## 2021-04-08 RX ORDER — POTASSIUM CHLORIDE 20 MEQ
40 PACKET (EA) ORAL EVERY 4 HOURS
Refills: 0 | Status: COMPLETED | OUTPATIENT
Start: 2021-04-08 | End: 2021-04-08

## 2021-04-08 RX ORDER — POTASSIUM CHLORIDE 20 MEQ
10 PACKET (EA) ORAL
Refills: 0 | Status: COMPLETED | OUTPATIENT
Start: 2021-04-08 | End: 2021-04-08

## 2021-04-08 RX ORDER — LOPERAMIDE HCL 2 MG
2 TABLET ORAL ONCE
Refills: 0 | Status: COMPLETED | OUTPATIENT
Start: 2021-04-08 | End: 2021-04-08

## 2021-04-08 RX ORDER — POTASSIUM CHLORIDE 20 MEQ
40 PACKET (EA) ORAL ONCE
Refills: 0 | Status: DISCONTINUED | OUTPATIENT
Start: 2021-04-08 | End: 2021-04-08

## 2021-04-08 RX ORDER — FERROUS SULFATE 325(65) MG
325 TABLET ORAL DAILY
Refills: 0 | Status: DISCONTINUED | OUTPATIENT
Start: 2021-04-08 | End: 2021-04-10

## 2021-04-08 RX ADMIN — SENNA PLUS 2 TABLET(S): 8.6 TABLET ORAL at 22:16

## 2021-04-08 RX ADMIN — Medication 1 TABLET(S): at 02:45

## 2021-04-08 RX ADMIN — Medication 1 TABLET(S): at 03:15

## 2021-04-08 RX ADMIN — Medication 40 MILLIEQUIVALENT(S): at 13:36

## 2021-04-08 RX ADMIN — MIDODRINE HYDROCHLORIDE 10 MILLIGRAM(S): 2.5 TABLET ORAL at 18:11

## 2021-04-08 RX ADMIN — Medication 100 MILLIEQUIVALENT(S): at 02:33

## 2021-04-08 RX ADMIN — Medication 40 MILLIEQUIVALENT(S): at 10:21

## 2021-04-08 RX ADMIN — SEVELAMER CARBONATE 800 MILLIGRAM(S): 2400 POWDER, FOR SUSPENSION ORAL at 12:14

## 2021-04-08 RX ADMIN — Medication 0.5 MILLIGRAM(S): at 10:21

## 2021-04-08 RX ADMIN — SEVELAMER CARBONATE 800 MILLIGRAM(S): 2400 POWDER, FOR SUSPENSION ORAL at 09:50

## 2021-04-08 RX ADMIN — Medication 100 MILLIEQUIVALENT(S): at 03:35

## 2021-04-08 RX ADMIN — Medication 100 MILLIEQUIVALENT(S): at 01:13

## 2021-04-08 RX ADMIN — MIDODRINE HYDROCHLORIDE 10 MILLIGRAM(S): 2.5 TABLET ORAL at 12:14

## 2021-04-08 RX ADMIN — SEVELAMER CARBONATE 800 MILLIGRAM(S): 2400 POWDER, FOR SUSPENSION ORAL at 18:11

## 2021-04-08 RX ADMIN — CHLORHEXIDINE GLUCONATE 1 APPLICATION(S): 213 SOLUTION TOPICAL at 06:07

## 2021-04-08 RX ADMIN — MIDODRINE HYDROCHLORIDE 10 MILLIGRAM(S): 2.5 TABLET ORAL at 06:04

## 2021-04-08 RX ADMIN — Medication 2 MILLIGRAM(S): at 09:50

## 2021-04-08 NOTE — PHYSICAL THERAPY INITIAL EVALUATION ADULT - PERTINENT HX OF CURRENT PROBLEM, REHAB EVAL
Pt is a 71 yo female with PMH of Afib, asthma, anxiety, colitis, gastritis, dorsalgia, UTI,  malaise & fatigue, and dyspnea brought to [facility] ED 4/5 by ems from home with AMS and back pain. Pt is a poor historian. Pt was found at her home down in the bathtub x4 days after a fall covered with fecal material. Pt admitted for sacral decubitis skin ulcer, renal failure, hyperkalemia, and rhabdomyolysis. Onset of wounds related to prolonged pressure to areas in bath tub.

## 2021-04-08 NOTE — PHYSICAL THERAPY INITIAL EVALUATION ADULT - GENERAL OBSERVATIONS, REHAB EVAL
Pt encountered being cleaned by PCAs in bed, A&Ox3 with no acute distress, complaining of right knee pain. KAT Salazar aware - Coordinated pain meds with assessment. +IV intact, +primafit. Pt presents with pressure injuries to right and left buttock, right and left heels, and right calf. Primary dressings intact.

## 2021-04-08 NOTE — BH CONSULTATION LIAISON PROGRESS NOTE - CURRENT MEDICATION
MEDICATIONS  (STANDING):  chlorhexidine 2% Cloths 1 Application(s) Topical <User Schedule>  midodrine. 10 milliGRAM(s) Oral three times a day  potassium chloride    Tablet ER 40 milliEquivalent(s) Oral every 4 hours  senna 2 Tablet(s) Oral at bedtime  sevelamer carbonate 800 milliGRAM(s) Oral three times a day with meals    MEDICATIONS  (PRN):  acetaminophen 300 mG/codeine 30 mG 1 Tablet(s) Oral every 6 hours PRN Moderate Pain (4 - 6)  ALPRAZolam 0.5 milliGRAM(s) Oral two times a day PRN anxiety

## 2021-04-08 NOTE — PROGRESS NOTE ADULT - ASSESSMENT
HPI:       70 years old female      arrived  via   ems from home       pt was found in the bathtub, pt  is not  a good  historian and  pt told  er,, she  was  in  her bath tub for   x 4 days, pt found   with fecal material       Pt is alert and follows verbal commends         Pt is oriented x 3, an  sts she was unable to get up     pt is taking warfarin due to hx of atrial fib.    Now,  pt states, She is unsure,  when she last took her meds.  or  when she fell or how  long she  was in her  bath tub (05 Apr 2021 15:25)  ---- As Above ------------------------------------ Patient seen earlier today  The patient is a poor historian. Patient denies melena, hematochezia, hematemesis, nausea, vomiting, abdominal pain, constipation, diarrhea, or change in bowel movements  Last colonoscopy ? Patient on Wafarin and denies NSAIDs.  Patient noted to have rhabdo and JOAQUIN on admission.     1)Fecal impaction by CT scan - patient refused rectal exam- s/p enema --> 5 BMs 1) Lactulose BID 2) D/C bisacodyl 3) Mag citrate x 1  2) Anemia - 7.4  On coumadin. No signs of melena, hematochezia or hematemsis 1) Blood work ordered. 2) Patient refusing GI work up - Discussed for 5 minutes  3) Liver lesions, Lymphadenopathy - 1)  IV CT Scan  ( Cr 2.49 ) VS MRI ( Patient refusing ) -   2) CEA   4) Elevated LFTs - OT > PT , Improving , probably secondary to Rhabdo. Alk phos increased to 174 1) f/u labs 2) f/u work up if no further improvement HPI:       70 years old female      arrived  via   ems from home       pt was found in the bathtub, pt  is not  a good  historian and  pt told  er,, she  was  in  her bath tub for   x 4 days, pt found   with fecal material       Pt is alert and follows verbal commends         Pt is oriented x 3, an  sts she was unable to get up     pt is taking warfarin due to hx of atrial fib.    Now,  pt states, She is unsure,  when she last took her meds.  or  when she fell or how  long she  was in her  bath tub (05 Apr 2021 15:25)  ---- As Above ------------------------------------ Patient seen earlier today  The patient is a poor historian. Patient denies melena, hematochezia, hematemesis, nausea, vomiting, abdominal pain, constipation, diarrhea, or change in bowel movements  Last colonoscopy ? Patient on Wafarin and denies NSAIDs.  Patient noted to have rhabdo and JOAQUIN on admission.     1)Fecal impaction by CT scan - patient refused rectal exam- s/p enema --> 5 BMs 1) Lactulose BID 2) D/C bisacodyl 3) Mag citrate x 1  2) Anemia - 7.8  On coumadin. No signs of melena, hematochezia or hematemsis 1) Blood work ordered. 2) Patient refusing GI work up - Discussed again for 5 minutes  3) Liver lesions, Lymphadenopathy - 1)  IV CT Scan  ( Cr 1.29 ) VS MRI ( Patient refusing both ) -   2) CEA   4) Elevated LFTs - OT > PT , Improving , probably secondary to Rhabdo. Alk phos increased to 174 1) f/u labs 2) f/u work up if no further improvement  ===== Discussed with patient. Patient refusing all work up. Discussed ramifications Patient wants to see her PMD first. Will follow on a PRN basis

## 2021-04-08 NOTE — BH CONSULTATION LIAISON PROGRESS NOTE - NSBHFUPINTERVALHXFT_PSY_A_CORE
Patient looks and feels much better today. She is calm, cooperative, fully engages and reports that she ambulates with a cane or a walker at home, has chronic LE pain and impaired gait. She got stuck in the bathroom and could not hoist herself up to get out. She thus spent days there not eating, drinking and soiling herself. Patient states that her neighbors help her out often and they are taking care of her cats now. She has A-fib and her Internist recently told her she needs a Cardiac work up so she was supposed to go to [facility] for it but ended up admitted here. Patient states that her house is under construction - she has an old house, she saved up money to get some pipes replaced and walls gutted, painted so her house is in disarray. She has running water and electricity, fridge, working stove etc. Endorses stable euthymic mood, regular sleep / appetite / energy level / concentration. Denies / does not manifest any symptoms of hypomania/mateo/psychosis/major depression/ anxiety/panic. Denies any active or passive suicidal or homicidal ideation. Names protective factors (andrew; family; hope for future). Endorses medication compliance. Denies adverse medication side effects

## 2021-04-08 NOTE — BH CONSULTATION LIAISON PROGRESS NOTE - NSBHMSEMOOD_PSY_A_CORE
Normal Complex Repair And Tissue Cultured Epidermal Autograft Text: The defect edges were debeveled with a #15 scalpel blade.  The primary defect was closed partially with a complex linear closure.  Given the location of the defect, shape of the defect and the proximity to free margins an tissue cultured epidermal autograft was deemed most appropriate to repair the remaining defect.  The graft was trimmed to fit the size of the remaining defect.  The graft was then placed in the primary defect, oriented appropriately, and sutured into place.

## 2021-04-08 NOTE — BH CONSULTATION LIAISON PROGRESS NOTE - NSBHASSESSMENTFT_PSY_ALL_CORE
Patient with initial bout of multifactorial delirium which has now regressed with hydration and medical optimization. Patient's mental status improved and she is back/near baseline. Collateral from her Internist confirms Patient has been functioning including being medication and treatment compliant, filling prescriptions, following up with scheduled appointments. Patient is well nourished with no signs of chronic poor self care. She gives a plausible explanation  of her house's condition and her narrative is linear and consistent. Patient meets elements of capacity at this time and can make her medical decisions including engaging in discharge planning.

## 2021-04-08 NOTE — PROGRESS NOTE ADULT - ASSESSMENT
70F with Atrial Fibrillation, and HTN admitted for Fall and Rhabdomyolysis     Rhabdomyolysis   Continue IVF and trend CK   Renal Function improving now     Syncope / Atrial Fibrillation  Rate controlled; Holding Digoxin and Cardizem for now due to low BP  Warfarin for AC and increased risk of DVT and PE due to possible Malignancy , hold tonights dose as inr is high    Acute Renal Failure  improving   Worsened most likely from underlying Rhabdomyolysis and possible obstruction from compression of impacted stool in rectum   Nephrology on board     replete hypokalemia       Gram neg bacteremia-proteus  -likely 2/2 uti, c/w cftx, f/u cultures     ? Malignancy   Imaging reviewed   Will need Oncoly Consultation when more stable  pt refusing work up now       Anemia. pt refusing blood for now, will monitor psych eval     Sacral Ulcer  Wound care    Diet  Regular      Psych on board, pt with poor insight into her medical situation. Unable to return home as per APS      70F with Atrial Fibrillation, and HTN admitted for Fall and Rhabdomyolysis     Rhabdomyolysis- unrelated to trauma  Continue IVF and trend CK   Renal Function improving now     Syncope / Atrial Fibrillation  Rate controlled; Holding Digoxin and Cardizem for now due to low BP  Warfarin for AC and increased risk of DVT and PE due to possible Malignancy , hold tonights dose as inr is high    Acute Renal Failure  improving   Worsened most likely from underlying Rhabdomyolysis and possible obstruction from compression of impacted stool in rectum   Nephrology on board     replete hypokalemia       Gram neg bacteremia-proteus w Sepsis POA   -likely 2/2 uti, c/w cftx, f/u cultures     ? Malignancy   Imaging reviewed   Will need Oncoly Consultation when more stable  pt refusing work up now       Anemia. pt refusing blood for now, will monitor psych eval     Sacral Ulcer   DTI to R heel, 4x3.5cm,  DTI to R calf, 12x2.3cm,   Stage II Pressure injury to L heel, 16x12x.1cm,    DTI R superior buttock, 4x5cm,   Stage II pressure injury R inferior buttock, 2x2.5x.1cm,  Stage II pressure injury L buttock, 5x5.5x.1cm    PT wound is on board     Diet  Regular      Psych on board, pt with poor insight into her medical situation. Unable to return home as per APS

## 2021-04-08 NOTE — PHYSICAL THERAPY INITIAL EVALUATION ADULT - MODALITIES TREATMENT COMMENTS
Suspected DTI to R heel, 4x3.5cm, no discharge, no odor, periwound intact; Suspected DTI to R calf, 12x2.3cm, non-blanchable errythema, no discharge, periwound intact; Stage II Pressure injury to L heel, 16x12x.1cm, small amount of serous drainage, no odor, periwound intact; Suspected DTI R superior buttock, 4x5cm, no discharge, periwound intact; Stage II pressure injury R inferior buttock, 2x2.5x.1cm, small amount of serous drainage, no odor, periwound intact; Stage II pressure injury L buttock, 5x5.5x.1cm, small amount of serous drainage, no odor, periwound intact Suspected DTI to R heel, 4x3.5cm, no discharge, no odor, periwound intact; Suspected DTI to R calf, 12x2.3cm, no discharge, periwound intact; Stage II Pressure injury to L heel, 16x12x.1cm, mod amount of serosanguineous drainage, no odor, periwound intact; Suspected DTI R superior buttock, 4x5cm, no discharge, periwound intact; Stage II pressure injury R inferior buttock, 2x2.5x.1cm, mod amount of serosanguineous drainage, no odor, periwound intact; Stage II pressure injury L buttock, 5x5.5x.1cm, mod amount of serosanguineous drainage, no odor, periwound intact

## 2021-04-08 NOTE — BH CONSULTATION LIAISON PROGRESS NOTE - NSBHCHARTREVIEWVS_PSY_A_CORE FT
Vital Signs Last 24 Hrs  T(C): 36.7 (08 Apr 2021 10:39), Max: 36.8 (07 Apr 2021 16:20)  T(F): 98 (08 Apr 2021 10:39), Max: 98.3 (08 Apr 2021 02:15)  HR: 70 (08 Apr 2021 10:39) (66 - 70)  BP: 96/65 (08 Apr 2021 10:39) (96/65 - 109/79)  BP(mean): --  RR: 18 (08 Apr 2021 10:39) (18 - 19)  SpO2: 96% (08 Apr 2021 10:39) (95% - 97%)

## 2021-04-08 NOTE — PROVIDER CONTACT NOTE (CRITICAL VALUE NOTIFICATION) - TEST AND RESULT REPORTED:
blood culture done on fifth growth in aerobic bottle gram negative rods
lactate- 2.3
potassium- 2.2
pt 65.4 inr 6.15
troponin- 0.081

## 2021-04-08 NOTE — BH CONSULTATION LIAISON PROGRESS NOTE - NSBHCHARTREVIEWINVESTIGATE_PSY_A_CORE FT
04-08    142  |  109<H>  |  30<H>  ----------------------------<  84  3.1<L>   |  25  |  1.25    Ca    6.8<L>      08 Apr 2021 08:31  Phos  2.3     04-08  Mg     2.6     04-08    TPro  5.2<L>  /  Alb  1.5<L>  /  TBili  0.4  /  DBili  x   /  AST  43<H>  /  ALT  33  /  AlkPhos  174<H>  04-08

## 2021-04-09 LAB
ALBUMIN SERPL ELPH-MCNC: 1.5 G/DL — LOW (ref 3.3–5)
ALP SERPL-CCNC: 157 U/L — HIGH (ref 40–120)
ALT FLD-CCNC: 27 U/L — SIGNIFICANT CHANGE UP (ref 12–78)
ANION GAP SERPL CALC-SCNC: 8 MMOL/L — SIGNIFICANT CHANGE UP (ref 5–17)
AST SERPL-CCNC: 30 U/L — SIGNIFICANT CHANGE UP (ref 15–37)
BILIRUB SERPL-MCNC: 0.5 MG/DL — SIGNIFICANT CHANGE UP (ref 0.2–1.2)
BUN SERPL-MCNC: 18 MG/DL — SIGNIFICANT CHANGE UP (ref 7–23)
CALCIUM SERPL-MCNC: 7.1 MG/DL — LOW (ref 8.5–10.1)
CHLORIDE SERPL-SCNC: 106 MMOL/L — SIGNIFICANT CHANGE UP (ref 96–108)
CO2 SERPL-SCNC: 26 MMOL/L — SIGNIFICANT CHANGE UP (ref 22–31)
CREAT SERPL-MCNC: 0.77 MG/DL — SIGNIFICANT CHANGE UP (ref 0.5–1.3)
GLUCOSE SERPL-MCNC: 110 MG/DL — HIGH (ref 70–99)
HCT VFR BLD CALC: 26.3 % — LOW (ref 34.5–45)
HGB BLD-MCNC: 7.8 G/DL — LOW (ref 11.5–15.5)
INR BLD: 3.8 RATIO — HIGH (ref 0.88–1.16)
MCHC RBC-ENTMCNC: 26.8 PG — LOW (ref 27–34)
MCHC RBC-ENTMCNC: 29.7 GM/DL — LOW (ref 32–36)
MCV RBC AUTO: 90.4 FL — SIGNIFICANT CHANGE UP (ref 80–100)
NRBC # BLD: 0 /100 WBCS — SIGNIFICANT CHANGE UP (ref 0–0)
PLATELET # BLD AUTO: 196 K/UL — SIGNIFICANT CHANGE UP (ref 150–400)
POTASSIUM SERPL-MCNC: 3 MMOL/L — LOW (ref 3.5–5.3)
POTASSIUM SERPL-SCNC: 3 MMOL/L — LOW (ref 3.5–5.3)
PROT SERPL-MCNC: 4.9 GM/DL — LOW (ref 6–8.3)
PROTHROM AB SERPL-ACNC: 41.4 SEC — HIGH (ref 10.6–13.6)
RBC # BLD: 2.91 M/UL — LOW (ref 3.8–5.2)
RBC # FLD: 22.3 % — HIGH (ref 10.3–14.5)
SODIUM SERPL-SCNC: 140 MMOL/L — SIGNIFICANT CHANGE UP (ref 135–145)
WBC # BLD: 5.25 K/UL — SIGNIFICANT CHANGE UP (ref 3.8–10.5)
WBC # FLD AUTO: 5.25 K/UL — SIGNIFICANT CHANGE UP (ref 3.8–10.5)

## 2021-04-09 PROCEDURE — 99233 SBSQ HOSP IP/OBS HIGH 50: CPT

## 2021-04-09 RX ORDER — POTASSIUM CHLORIDE 20 MEQ
40 PACKET (EA) ORAL EVERY 4 HOURS
Refills: 0 | Status: COMPLETED | OUTPATIENT
Start: 2021-04-09 | End: 2021-04-09

## 2021-04-09 RX ORDER — OXYCODONE AND ACETAMINOPHEN 5; 325 MG/1; MG/1
1 TABLET ORAL ONCE
Refills: 0 | Status: DISCONTINUED | OUTPATIENT
Start: 2021-04-09 | End: 2021-04-09

## 2021-04-09 RX ADMIN — Medication 40 MILLIEQUIVALENT(S): at 13:46

## 2021-04-09 RX ADMIN — Medication 1 TABLET(S): at 19:34

## 2021-04-09 RX ADMIN — Medication 1 TABLET(S): at 21:27

## 2021-04-09 RX ADMIN — CHLORHEXIDINE GLUCONATE 1 APPLICATION(S): 213 SOLUTION TOPICAL at 05:55

## 2021-04-09 RX ADMIN — Medication 1 TABLET(S): at 13:04

## 2021-04-09 RX ADMIN — MIDODRINE HYDROCHLORIDE 10 MILLIGRAM(S): 2.5 TABLET ORAL at 17:36

## 2021-04-09 RX ADMIN — MIDODRINE HYDROCHLORIDE 10 MILLIGRAM(S): 2.5 TABLET ORAL at 11:00

## 2021-04-09 RX ADMIN — OXYCODONE AND ACETAMINOPHEN 1 TABLET(S): 5; 325 TABLET ORAL at 05:52

## 2021-04-09 RX ADMIN — Medication 40 MILLIEQUIVALENT(S): at 10:57

## 2021-04-09 RX ADMIN — Medication 1 TABLET(S): at 12:14

## 2021-04-09 RX ADMIN — Medication 0.5 MILLIGRAM(S): at 21:44

## 2021-04-09 RX ADMIN — OXYCODONE AND ACETAMINOPHEN 1 TABLET(S): 5; 325 TABLET ORAL at 06:52

## 2021-04-09 RX ADMIN — MIDODRINE HYDROCHLORIDE 10 MILLIGRAM(S): 2.5 TABLET ORAL at 05:07

## 2021-04-09 RX ADMIN — Medication 325 MILLIGRAM(S): at 11:00

## 2021-04-09 NOTE — PROGRESS NOTE ADULT - ASSESSMENT
70F with Atrial Fibrillation, and HTN admitted for Fall and Rhabdomyolysis     Rhabdomyolysis- unrelated to trauma  improved   Renal Function improving now     Syncope / Atrial Fibrillation  Rate controlled; Holding Digoxin and Cardizem for now due to low BP  Warfarin for AC and increased risk of DVT and PE due to possible Malignancy , hold tonights dose as inr is high    Acute Renal Failure  improving   Worsened most likely from underlying Rhabdomyolysis and possible obstruction from compression of impacted stool in rectum   Nephrology on board     replete hypokalemia       Gram neg bacteremia-proteus w Sepsis POA   -likely 2/2 uti, c/w cftx, f/u cultures     ? Malignancy   Imaging reviewed   Will need Oncoly Consultation when more stable  pt refusing work up now       Anemia. pt refusing blood for now, will monitor psych eval     Sacral Ulcer   DTI to R heel, 4x3.5cm,  DTI to R calf, 12x2.3cm,   Stage II Pressure injury to L heel, 16x12x.1cm,    DTI R superior buttock, 4x5cm,   Stage II pressure injury R inferior buttock, 2x2.5x.1cm,  Stage II pressure injury L buttock, 5x5.5x.1cm    PT wound is on board     Diet  Regular      Psych on board, per psych able to make medical decisions. Unable to return home as per APS will need rehab placement

## 2021-04-09 NOTE — PROGRESS NOTE ADULT - PROVIDER SPECIALTY LIST ADULT
Nephrology
Nephrology
Gastroenterology
Nephrology
Nephrology
Hospitalist
Gastroenterology
Hospitalist

## 2021-04-09 NOTE — PROGRESS NOTE ADULT - SUBJECTIVE AND OBJECTIVE BOX
Patient is a 70y old  Female who presents with a chief complaint of ams/  s/p fall (08 Apr 2021 10:42)      HPI:       70 years old female      arrived  via   ems from home       pt was found in the bathtub, pt  is not  a good  historian and  pt told  er,, she  was  in  her bath tub for   x 4 days, pt found   with fecal material       Pt is alert and follows verbal commends         Pt is oriented x 3, an  sts she was unable to get up     pt is taking warfarin due to hx of atrial fib.    Now,  pt states, She is unsure,  when she last took her meds.  or  when she fell or how  long she  was in her  bath tub (05 Apr 2021 15:25)      INTERVAL HPI/OVERNIGHT EVENTS:  The patient denies melena, hematochezia, hematemesis, nausea, vomiting, abdominal pain, constipation, diarrhea, or change in bowel movements     MEDICATIONS  (STANDING):  chlorhexidine 2% Cloths 1 Application(s) Topical <User Schedule>  ferrous    sulfate 325 milliGRAM(s) Oral daily  midodrine. 10 milliGRAM(s) Oral three times a day  senna 2 Tablet(s) Oral at bedtime  sevelamer carbonate 800 milliGRAM(s) Oral three times a day with meals    MEDICATIONS  (PRN):  acetaminophen 300 mG/codeine 30 mG 1 Tablet(s) Oral every 6 hours PRN Moderate Pain (4 - 6)  ALPRAZolam 0.5 milliGRAM(s) Oral two times a day PRN anxiety      FAMILY HISTORY:      Allergies    penicillin (Seizure)  shellfish (Swelling; Short breath)  Sulfur (Hives)    Intolerances        PMH/PSH:  No pertinent past medical history    Dyspnea    Malaise and fatigue    Anxiety    UTI (urinary tract infection)    Gastritis    Colitis    Asthma    Dorsalgia    Pulmonary fibrosis    Atrial fibrillation    No significant past surgical history          REVIEW OF SYSTEMS:  CONSTITUTIONAL: No fever, weight loss,   RESPIRATORY: No cough, wheezing, chills or hemoptysis; No shortness of breath  CARDIOVASCULAR: No chest pain, palpitations, dizziness, or leg swelling  GASTROINTESTINAL: No abdominal or epigastric pain. No nausea, vomiting, or hematemesis; No diarrhea or constipation. No melena or hematochezia.      Vital Signs Last 24 Hrs  T(C): 36.8 (08 Apr 2021 17:11), Max: 36.8 (08 Apr 2021 02:15)  T(F): 98.3 (08 Apr 2021 17:11), Max: 98.3 (08 Apr 2021 02:15)  HR: 67 (08 Apr 2021 17:11) (67 - 70)  BP: 97/62 (08 Apr 2021 17:11) (96/65 - 109/79)  BP(mean): --  RR: 18 (08 Apr 2021 17:11) (18 - 19)  SpO2: 100% (08 Apr 2021 17:11) (95% - 100%)    PHYSICAL EXAM:  GENERAL: NAD, well-groomed, well-developed  CHEST/LUNG: Clear to percussion bilaterally; No rales, rhonchi, wheezing, or rubs  HEART: Regular rate and rhythm; No murmurs, rubs, or gallops  ABDOMEN: Soft, Nontender, Nondistended; Bowel sounds present      LAB                          7.8    5.29  )-----------( 218      ( 08 Apr 2021 08:31 )             25.6       CBC:  04-08 @ 08:31  WBC 5.29   Hgb 7.8   Hct 25.6   Plts 218  MCV 89.5  04-07 @ 07:40  WBC 5.15   Hgb 7.4   Hct 24.3   Plts 220  MCV 88.7  04-06 @ 15:53  WBC 8.53   Hgb 8.0   Hct 26.4   Plts 245  MCV 90.1  04-06 @ 10:32  WBC 7.16   Hgb 8.0   Hct 26.2   Plts 238  MCV 89.1  04-06 @ 07:27  WBC 7.57   Hgb 7.9   Hct 26.8   Plts 250  MCV 90.5  04-06 @ 02:50  WBC 8.77   Hgb 8.1   Hct 27.3   Plts 240  MCV 91.3  04-05 @ 22:39  WBC 11.04   Hgb 8.7   Hct 28.7   Plts 277  MCV 90.0  04-05 @ 21:43  WBC 11.26   Hgb 8.2   Hct 27.0   Plts 272  MCV 88.8  04-05 @ 18:58  WBC 11.77   Hgb 8.9   Hct 29.5   Plts 281  MCV 91.0  04-05 @ 16:36  WBC 11.74   Hgb 8.8   Hct 29.1   Plts 270  MCV 90.1      Chemistry:  04-08 @ 08:31  Na+ 142  K+ 3.1  Cl- 109  CO2 25  BUN 30  Cr 1.25     04-07 @ 07:40  Na+ 140  K+ 3.0  Cl- 107  CO2 22  BUN 53  Cr 2.49     04-06 @ 07:27  Na+ 140  K+ 4.2  Cl- 108  CO2 19  BUN 74  Cr 4.20     04-05 @ 22:40  Na+ 137  K+ 4.8  Cl- 106  CO2 19  BUN 76  Cr 4.87     04-05 @ 16:36  Na+ 135  K+ 5.9  Cl- 104  CO2 19  BUN 77  Cr 5.11     04-05 @ 11:01  Na+ 135  K+ 5.7  Cl- 98  CO2 20  BUN 77  Cr 5.74         Glucose, Serum: 84 mg/dL (04-08 @ 08:31)  Glucose, Serum: 76 mg/dL (04-07 @ 07:40)  Glucose, Serum: 79 mg/dL (04-06 @ 07:27)  Glucose, Serum: 89 mg/dL (04-05 @ 22:40)  Glucose, Serum: 96 mg/dL (04-05 @ 16:36)  Glucose, Serum: 103 mg/dL (04-05 @ 11:01)      08 Apr 2021 08:31    142    |  109    |  30     ----------------------------<  84     3.1     |  25     |  1.25   07 Apr 2021 07:40    140    |  107    |  53     ----------------------------<  76     3.0     |  22     |  2.49   06 Apr 2021 07:27    140    |  108    |  74     ----------------------------<  79     4.2     |  19     |  4.20   05 Apr 2021 22:40    137    |  106    |  76     ----------------------------<  89     4.8     |  19     |  4.87   05 Apr 2021 16:36    135    |  104    |  77     ----------------------------<  96     5.9     |  19     |  5.11   05 Apr 2021 11:01    135    |  98     |  77     ----------------------------<  103    5.7     |  20     |  5.74     Ca    6.8        08 Apr 2021 08:31  Ca    7.0        07 Apr 2021 07:40  Ca    6.9        06 Apr 2021 07:27  Ca    7.1        05 Apr 2021 22:40  Ca    6.9        05 Apr 2021 16:36  Ca    8.0        05 Apr 2021 11:01  Phos  2.3       08 Apr 2021 08:31  Phos  7.3       06 Apr 2021 07:27  Phos  7.3       05 Apr 2021 22:40  Phos  7.6       05 Apr 2021 16:36  Mg     2.6       08 Apr 2021 08:31  Mg     4.4       06 Apr 2021 07:27  Mg     4.8       05 Apr 2021 22:40  Mg     5.1       05 Apr 2021 16:36  Mg     5.5       05 Apr 2021 11:01    TPro  5.2    /  Alb  1.5    /  TBili  0.4    /  DBili  x      /  AST  43     /  ALT  33     /  AlkPhos  174    08 Apr 2021 08:31  TPro  5.2    /  Alb  1.8    /  TBili  0.5    /  DBili  .26    /  AST  98     /  ALT  34     /  AlkPhos  134    06 Apr 2021 07:27  TPro  7.5    /  Alb  2.6    /  TBili  0.6    /  DBili  x      /  AST  109    /  ALT  34     /  AlkPhos  194    05 Apr 2021 11:01      PT/INR - ( 08 Apr 2021 08:31 )   PT: 65.4 sec;   INR: 6.15 ratio                 CAPILLARY BLOOD GLUCOSE              RADIOLOGY & ADDITIONAL TESTS:    Imaging Personally Reviewed:  [ ] YES  [ ] NO    Consultant(s) Notes Reviewed:  [ ] YES  [ ] NO    Care Discussed with Consultants/Other Providers [ ] YES  [ ] NO
Subjective: No complaints     MEDICATIONS  (STANDING):  bisacodyl 5 milliGRAM(s) Oral every 12 hours  cefTRIAXone   IVPB 1000 milliGRAM(s) IV Intermittent every 24 hours  chlorhexidine 2% Cloths 1 Application(s) Topical <User Schedule>  midodrine. 10 milliGRAM(s) Oral three times a day  polyethylene glycol 3350 17 Gram(s) Oral daily  senna 2 Tablet(s) Oral at bedtime  sevelamer carbonate 800 milliGRAM(s) Oral three times a day with meals  sodium chloride 0.9%. 1000 milliLiter(s) (100 mL/Hr) IV Continuous <Continuous>  sodium zirconium cyclosilicate 10 Gram(s) Oral two times a day    MEDICATIONS  (PRN):      Allergies    penicillin (Seizure)  shellfish (Swelling; Short breath)  Sulfur (Hives)    Intolerances        Vital Signs Last 24 Hrs  T(C): 36.8 (2021 09:24), Max: 36.9 (2021 04:55)  T(F): 98.2 (2021 09:24), Max: 98.5 (2021 04:55)  HR: 66 (2021 09:24) (58 - 66)  BP: 93/46 (2021 09:24) (83/38 - 114/48)  BP(mean): --  RR: 20 (2021 09:24) (19 - 22)  SpO2: 99% (2021 09:24) (99% - 100%)    PHYSICAL EXAM:  GENERAL: NAD, well-groomed, well-developed  HEAD:  Atraumatic, Normocephalic  ENMT: Moist mucous membranes,   NECK: Supple, No JVD, Normal thyroid  NERVOUS SYSTEM:  All 4 extremities mobile, no gross sensory deficits.   CHEST/LUNG: Clear to auscultation bilaterally; No rales, rhonchi, wheezing, or rubs  HEART: Regular rate and rhythm; No murmurs, rubs, or gallops  ABDOMEN: Soft, Nontender, Nondistended; Bowel sounds present  EXTREMITIES:  2+ Peripheral Pulses, No clubbing, cyanosis, or edema      LABS:                        8.0    7.16  )-----------( 238      ( 2021 10:32 )             26.2     2021 07:27    140    |  108    |  74     ----------------------------<  79     4.2     |  19     |  4.20     Ca    6.9        2021 07:27  Phos  7.3       2021 07:27  Mg     4.4       2021 07:27    TPro  5.2    /  Alb  1.8    /  TBili  0.5    /  DBili  .26    /  AST  98     /  ALT  34     /  AlkPhos  134    2021 07:27    PT/INR - ( 2021 07:27 )   PT: 24.0 sec;   INR: 2.15 ratio         PTT - ( 2021 11:01 )  PTT:39.9 sec  Urinalysis Basic - ( 2021 15:45 )    Color: Yellow / Appearance: very cloudy / S.015 / pH: x  Gluc: x / Ketone: Negative  / Bili: Small / Urobili: Negative mg/dL   Blood: x / Protein: 30 mg/dL / Nitrite: Negative   Leuk Esterase: Moderate / RBC: 6-10 /HPF / WBC 11-25   Sq Epi: x / Non Sq Epi: x / Bacteria: Few      CAPILLARY BLOOD GLUCOSE          RADIOLOGY & ADDITIONAL TESTS:    Imaging Personally Reviewed:  [ ] YES     Consultant(s) Notes Reviewed:      Care Discussed with Consultants/Other Providers:    Advanced Directives: [ ] DNR  [ ] No feeding tube  [ ] MOLST in chart  [ ] MOLST completed today  [ ] Unknown  
Patient is a 70y old  Female who presents with a chief complaint of ams/  s/p fall (08 Apr 2021 10:02)      INTERVAL HPI/OVERNIGHT EVENTS: no events     MEDICATIONS  (STANDING):  chlorhexidine 2% Cloths 1 Application(s) Topical <User Schedule>  midodrine. 10 milliGRAM(s) Oral three times a day  potassium chloride    Tablet ER 40 milliEquivalent(s) Oral every 4 hours  senna 2 Tablet(s) Oral at bedtime  sevelamer carbonate 800 milliGRAM(s) Oral three times a day with meals    MEDICATIONS  (PRN):  acetaminophen 300 mG/codeine 30 mG 1 Tablet(s) Oral every 6 hours PRN Moderate Pain (4 - 6)  ALPRAZolam 0.5 milliGRAM(s) Oral two times a day PRN anxiety      Allergies    penicillin (Seizure)  shellfish (Swelling; Short breath)  Sulfur (Hives)    Intolerances         Vital Signs Last 24 Hrs  T(C): 36.7 (08 Apr 2021 10:39), Max: 36.8 (07 Apr 2021 16:20)  T(F): 98 (08 Apr 2021 10:39), Max: 98.3 (08 Apr 2021 02:15)  HR: 70 (08 Apr 2021 10:39) (66 - 70)  BP: 96/65 (08 Apr 2021 10:39) (96/65 - 109/79)  BP(mean): --  RR: 18 (08 Apr 2021 10:39) (18 - 19)  SpO2: 96% (08 Apr 2021 10:39) (95% - 97%)    PHYSICAL EXAM:  GENERAL: NAD, well-groomed, well-developed  HEAD:  Atraumatic, Normocephalic  EYES: EOMI, PERRLA, conjunctiva and sclera clear  ENMT: No tonsillar erythema, exudates, or enlargement; Moist mucous membranes, Good dentition, No lesions  NECK: Supple, No JVD, Normal thyroid  NERVOUS SYSTEM:  Alert & Oriented X3, Good concentration; Motor Strength 5/5 B/L upper and lower extremities; DTRs 2+ intact and symmetric  CHEST/LUNG: Clear to percussion bilaterally; No rales, rhonchi, wheezing, or rubs  HEART: Regular rate and rhythm; No murmurs, rubs, or gallops  ABDOMEN: Soft, Nontender, Nondistended; Bowel sounds present  EXTREMITIES:  2+ Peripheral Pulses, No clubbing, cyanosis, or edema  LYMPH: No lymphadenopathy noted  SKIN: No rashes or lesions    LABS:                        7.8    5.29  )-----------( 218      ( 08 Apr 2021 08:31 )             25.6     04-08    142  |  109<H>  |  30<H>  ----------------------------<  84  3.1<L>   |  25  |  1.25    Ca    6.8<L>      08 Apr 2021 08:31  Phos  2.3     04-08  Mg     2.6     04-08    TPro  5.2<L>  /  Alb  1.5<L>  /  TBili  0.4  /  DBili  x   /  AST  43<H>  /  ALT  33  /  AlkPhos  174<H>  04-08    PT/INR - ( 08 Apr 2021 08:31 )   PT: 65.4 sec;   INR: 6.15 ratio             CAPILLARY BLOOD GLUCOSE          RADIOLOGY & ADDITIONAL TESTS:    Imaging Personally Reviewed:  [ X] YES  [ ] NO    Consultant(s) Notes Reviewed:  [ X] YES  [ ] NO    Care Discussed with Consultants/Other Providers [X ] YES  [ ] NO
Patient is a 70y old  Female who presents with a chief complaint of ams/  s/p fall (2021 11:53)      INTERVAL HPI/OVERNIGHT EVENTS: no events     MEDICATIONS  (STANDING):  cefTRIAXone   IVPB 1000 milliGRAM(s) IV Intermittent every 24 hours  chlorhexidine 2% Cloths 1 Application(s) Topical <User Schedule>  magnesium citrate Oral Solution 1 Bottle Oral once  midodrine. 10 milliGRAM(s) Oral three times a day  polyethylene glycol 3350 17 Gram(s) Oral daily  senna 2 Tablet(s) Oral at bedtime  sevelamer carbonate 800 milliGRAM(s) Oral three times a day with meals  sodium chloride 0.9%. 1000 milliLiter(s) (100 mL/Hr) IV Continuous <Continuous>    MEDICATIONS  (PRN):  oxycodone    5 mG/acetaminophen 325 mG 1 Tablet(s) Oral every 6 hours PRN Moderate Pain (4 - 6)      Allergies    penicillin (Seizure)  shellfish (Swelling; Short breath)  Sulfur (Hives)    Intolerances           Vital Signs Last 24 Hrs  T(C): 36.4 (2021 10:21), Max: 36.5 (2021 00:32)  T(F): 97.5 (2021 10:21), Max: 97.7 (2021 00:32)  HR: 68 (2021 10:21) (60 - 72)  BP: 105/61 (2021 10:21) (91/56 - 151/83)  BP(mean): --  RR: 18 (2021 10:21) (18 - 18)  SpO2: 99% (2021 10:21) (98% - 100%)    PHYSICAL EXAM:  GENERAL: NAD, well-groomed, well-developed  HEAD:  Atraumatic, Normocephalic  EYES: EOMI, PERRLA, conjunctiva and sclera clear  ENMT: No tonsillar erythema, exudates, or enlargement; Moist mucous membranes, Good dentition, No lesions  NECK: Supple, No JVD, Normal thyroid  NERVOUS SYSTEM:  Alert & Oriented X2, Good concentration; Motor Strength 5/5 B/L upper and lower extremities; DTRs 2+ intact and symmetric  CHEST/LUNG: Clear to percussion bilaterally; No rales, rhonchi, wheezing, or rubs  HEART: Regular rate and rhythm; No murmurs, rubs, or gallops  ABDOMEN: Soft, Nontender, Nondistended; Bowel sounds present  EXTREMITIES:  2+ Peripheral Pulses, No clubbing, cyanosis, or edema  LYMPH: No lymphadenopathy noted  SKIN: No rashes or lesions    LABS:                        7.4    5.15  )-----------( 220      ( 2021 07:40 )             24.3     04-07    140  |  107  |  53<H>  ----------------------------<  76  3.0<L>   |  22  |  2.49<H>    Ca    7.0<L>      2021 07:40  Phos  7.3     04-06  Mg     4.4     04-06    TPro  5.2<L>  /  Alb  1.8<L>  /  TBili  0.5  /  DBili  .26<H>  /  AST  98<H>  /  ALT  34  /  AlkPhos  134<H>  04-06    PT/INR - ( 2021 07:40 )   PT: 30.2 sec;   INR: 2.73 ratio           Urinalysis Basic - ( 2021 15:45 )    Color: Yellow / Appearance: very cloudy / S.015 / pH: x  Gluc: x / Ketone: Negative  / Bili: Small / Urobili: Negative mg/dL   Blood: x / Protein: 30 mg/dL / Nitrite: Negative   Leuk Esterase: Moderate / RBC: 6-10 /HPF / WBC 11-25   Sq Epi: x / Non Sq Epi: x / Bacteria: Few      CAPILLARY BLOOD GLUCOSE          RADIOLOGY & ADDITIONAL TESTS:    Imaging Personally Reviewed:  [ X] YES  [ ] NO    Consultant(s) Notes Reviewed:  [ X] YES  [ ] NO    Care Discussed with Consultants/Other Providers [X ] YES  [ ] NO
Patient is a 70y old  Female who presents with a chief complaint of ams/  s/p fall (2021 21:05)      HPI:       70 years old female      arrived  via   ems from home       pt was found in the bathtub, pt  is not  a good  historian and  pt told  er,, she  was  in  her bath tub for   x 4 days, pt found   with fecal material       Pt is alert and follows verbal commends         Pt is oriented x 3, an  sts she was unable to get up     pt is taking warfarin due to hx of atrial fib.    Now,  pt states, She is unsure,  when she last took her meds.  or  when she fell or how  long she  was in her  bath tub (2021 15:25)      INTERVAL HPI/OVERNIGHT EVENTS:  Patient refused rectal / disimpaction last night ( see chart note ) , Reviewed the diagnosis with patient. She had 5 bowel movements over night after one enema. The patient denies melena, hematochezia, hematemesis, nausea, vomiting, abdominal pain, or constipation.     MEDICATIONS  (STANDING):  bisacodyl 5 milliGRAM(s) Oral every 12 hours  cefTRIAXone   IVPB 1000 milliGRAM(s) IV Intermittent every 24 hours  chlorhexidine 2% Cloths 1 Application(s) Topical <User Schedule>  midodrine. 10 milliGRAM(s) Oral three times a day  polyethylene glycol 3350 17 Gram(s) Oral daily  senna 2 Tablet(s) Oral at bedtime  sevelamer carbonate 800 milliGRAM(s) Oral three times a day with meals  sodium chloride 0.9%. 1000 milliLiter(s) (100 mL/Hr) IV Continuous <Continuous>    MEDICATIONS  (PRN):  oxycodone    5 mG/acetaminophen 325 mG 1 Tablet(s) Oral every 6 hours PRN Moderate Pain (4 - 6)      FAMILY HISTORY:      Allergies    penicillin (Seizure)  shellfish (Swelling; Short breath)  Sulfur (Hives)    Intolerances        PMH/PSH:  No pertinent past medical history    Dyspnea    Malaise and fatigue    Anxiety    UTI (urinary tract infection)    Gastritis    Colitis    Asthma    Dorsalgia    Pulmonary fibrosis    Atrial fibrillation    No significant past surgical history          REVIEW OF SYSTEMS:  CONSTITUTIONAL: No fever, weight loss,   EYES: No eye pain, visual disturbances, or discharge  ENMT:  No difficulty hearing, tinnitus, vertigo; No sinus or throat pain  NECK: No pain or stiffness  BREASTS: No pain, masses, or nipple discharge  RESPIRATORY: No cough, wheezing, chills or hemoptysis; No shortness of breath  CARDIOVASCULAR: No chest pain, palpitations, dizziness, or leg swelling  GASTROINTESTINAL: See above   GENITOURINARY: No dysuria, frequency, hematuria, or incontinence  NEUROLOGICAL: No headaches, memory loss, loss of strength, numbness, or tremors  SKIN: No itching, burning, rashes, or lesions   LYMPH NODES: No enlarged glands  ENDOCRINE: No heat or cold intolerance; No hair loss  MUSCULOSKELETAL: No joint pain or swelling; No muscle, back, or extremity pain  PSYCHIATRIC: No depression, anxiety, mood swings, or difficulty sleeping  HEME/LYMPH: No easy bruising, or bleeding gums  ALLERGY AND IMMUNOLOGIC: No hives or eczema    Vital Signs Last 24 Hrs  T(C): 36.4 (2021 10:21), Max: 36.5 (2021 00:32)  T(F): 97.5 (2021 10:), Max: 97.7 (2021 00:32)  HR: 68 (2021 10:) (60 - 72)  BP: 105/61 (2021 10:21) (91/56 - 151/83)  BP(mean): --  RR: 18 (2021 10:21) (18 - 18)  SpO2: 99% (2021 10:21) (98% - 100%)    PHYSICAL EXAM:  GENERAL: NAD, well-groomed, well-developed  HEAD:  Atraumatic, Normocephalic  EYES: EOMI, PERRLA, conjunctiva and sclera clear  NECK: Supple, No JVD, Normal thyroid  NERVOUS SYSTEM:  Alert & Oriented X3, Good concentration; Motor Strength 5/5 B/L upper and lower extremities;   CHEST/LUNG: Clear to percussion bilaterally; No rales, rhonchi, wheezing, or rubs  HEART: Regular rate and rhythm; No murmurs, rubs, or gallops  ABDOMEN: Soft, Nontender, Nondistended; Bowel sounds present  EXTREMITIES:  2+ Peripheral Pulses, No clubbing, cyanosis, or edema  LYMPH: No lymphadenopathy noted  SKIN: No rashes or lesions    LAB                          7.4    5.15  )-----------( 220      ( 2021 07:40 )             24.3       CBC:  04-07 @ 07:40  WBC 5.15   Hgb 7.4   Hct 24.3   Plts 220  MCV 88.7   @ 15:53  WBC 8.53   Hgb 8.0   Hct 26.4   Plts 245  MCV 90.1   @ 10:32  WBC 7.16   Hgb 8.0   Hct 26.2   Plts 238  MCV 89.1   @ 07:27  WBC 7.57   Hgb 7.9   Hct 26.8   Plts 250  MCV 90.5   @ 02:50  WBC 8.77   Hgb 8.1   Hct 27.3   Plts 240  MCV 91.3   @ 22:39  WBC 11.04   Hgb 8.7   Hct 28.7   Plts 277  MCV 90.0   @ 21:43  WBC 11.26   Hgb 8.2   Hct 27.0   Plts 272  MCV 88.8   @ 18:58  WBC 11.77   Hgb 8.9   Hct 29.5   Plts 281  MCV 91.0   @ 16:36  WBC 11.74   Hgb 8.8   Hct 29.1   Plts 270  MCV 90.1   @ 11:01  WBC 13.08   Hgb 10.5   Hct 34.9   Plts 347  MCV 90.2      Chemistry:   @ 07:40  Na+ 140  K+ 3.0  Cl- 107  CO2 22  BUN 53  Cr 2.49      @ 07:27  Na+ 140  K+ 4.2  Cl- 108  CO2 19  BUN 74  Cr 4.20      @ 22:40  Na+ 137  K+ 4.8  Cl- 106  CO2 19  BUN 76  Cr 4.87      @ 16:36  Na+ 135  K+ 5.9  Cl- 104  CO2 19  BUN 77  Cr 5.11      @ 11:01  Na+ 135  K+ 5.7  Cl- 98  CO2 20  BUN 77  Cr 5.74         Glucose, Serum: 76 mg/dL ( @ 07:40)  Glucose, Serum: 79 mg/dL ( @ 07:27)  Glucose, Serum: 89 mg/dL ( @ 22:40)  Glucose, Serum: 96 mg/dL ( @ 16:36)  Glucose, Serum: 103 mg/dL ( @ 11:01)      2021 07:40    140    |  107    |  53     ----------------------------<  76     3.0     |  22     |  2.49   2021 07:27    140    |  108    |  74     ----------------------------<  79     4.2     |  19     |  4.20   2021 22:40    137    |  106    |  76     ----------------------------<  89     4.8     |  19     |  4.87   2021 16:36    135    |  104    |  77     ----------------------------<  96     5.9     |  19     |  5.11   2021 11:01    135    |  98     |  77     ----------------------------<  103    5.7     |  20     |  5.74     Ca    7.0        2021 07:40  Ca    6.9        2021 07:27  Ca    7.1        2021 22:40  Ca    6.9        2021 16:36  Ca    8.0        2021 11:01  Phos  7.3       2021 07:27  Phos  7.3       2021 22:40  Phos  7.6       2021 16:36  Mg     4.4       2021 07:27  Mg     4.8       2021 22:40  Mg     5.1       2021 16:36  Mg     5.5       2021 11:01    TPro  5.2    /  Alb  1.8    /  TBili  0.5    /  DBili  .26    /  AST  98     /  ALT  34     /  AlkPhos  134    2021 07:27  TPro  7.5    /  Alb  2.6    /  TBili  0.6    /  DBili  x      /  AST  109    /  ALT  34     /  AlkPhos  194    2021 11:01      PT/INR - ( 2021 07:40 )   PT: 30.2 sec;   INR: 2.73 ratio             Urinalysis Basic - ( 2021 15:45 )    Color: Yellow / Appearance: very cloudy / S.015 / pH: x  Gluc: x / Ketone: Negative  / Bili: Small / Urobili: Negative mg/dL   Blood: x / Protein: 30 mg/dL / Nitrite: Negative   Leuk Esterase: Moderate / RBC: 6-10 /HPF / WBC 11-25   Sq Epi: x / Non Sq Epi: x / Bacteria: Few        CAPILLARY BLOOD GLUCOSE              RADIOLOGY & ADDITIONAL TESTS:    Imaging Personally Reviewed:  [ ] YES  [ ] NO    Consultant(s) Notes Reviewed:  [ ] YES  [ ] NO    Care Discussed with Consultants/Other Providers [ ] YES  [ ] NO
[facility] NEPHROLOGY SERVICES, [facility]  NEPHROLOGY AND HYPERTENSION  [address]  [address]  [place] [address]  [phone]    MD CEDRIC RUEDA, MD JUS VELAZQUEZ, MD JACOB ERWIN, MD ANANYA HAZEL, MD AFSANEH AMEZQUITA MD          Patient events noted  No distress       MEDICATIONS  (STANDING):  chlorhexidine 2% Cloths 1 Application(s) Topical <User Schedule>  ferrous    sulfate 325 milliGRAM(s) Oral daily  midodrine. 10 milliGRAM(s) Oral three times a day  potassium chloride    Tablet ER 40 milliEquivalent(s) Oral every 4 hours  senna 2 Tablet(s) Oral at bedtime  sevelamer carbonate 800 milliGRAM(s) Oral three times a day with meals    MEDICATIONS  (PRN):  acetaminophen 300 mG/codeine 30 mG 1 Tablet(s) Oral every 6 hours PRN Moderate Pain (4 - 6)  ALPRAZolam 0.5 milliGRAM(s) Oral two times a day PRN anxiety      04-08-21 @ 07:01  -  04-09-21 @ 07:00  --------------------------------------------------------  IN: 360 mL / OUT: 3950 mL / NET: -3590 mL    04-09-21 @ 07:01  -  04-09-21 @ 16:18  --------------------------------------------------------  IN: 0 mL / OUT: 1200 mL / NET: -1200 mL      PHYSICAL EXAM:      T(C): 36.6 (04-09-21 @ 11:00), Max: 37.3 (04-09-21 @ 00:23)  HR: 60 (04-09-21 @ 14:45) (53 - 67)  BP: 105/62 (04-09-21 @ 14:45) (96/60 - 116/59)  RR: 18 (04-09-21 @ 11:00) (18 - 18)  SpO2: 96% (04-09-21 @ 14:45) (96% - 100%)  Wt(kg): --  Lungs clear  Heart S1S2  Abd soft NT ND  Extremities:   tr edema                                    7.8    5.25  )-----------( 196      ( 09 Apr 2021 07:58 )             26.3     04-09    140  |  106  |  18  ----------------------------<  110<H>  3.0<L>   |  26  |  0.77    Ca    7.1<L>      09 Apr 2021 07:58  Phos  2.3     04-08  Mg     2.6     04-08    TPro  4.9<L>  /  Alb  1.5<L>  /  TBili  0.5  /  DBili  x   /  AST  30  /  ALT  27  /  AlkPhos  157<H>  04-09      LIVER FUNCTIONS - ( 09 Apr 2021 07:58 )  Alb: 1.5 g/dL / Pro: 4.9 gm/dL / ALK PHOS: 157 U/L / ALT: 27 U/L / AST: 30 U/L / GGT: x           Creatinine Trend: 0.77<--, 1.25<--, 2.49<--, 4.20<--, 4.87<--, 5.11<--      Assessment   JOAQUIN, CKD degree unclear, pre/ post renal factors  Hypokalemia, possible renal wasting post injury   Improving     Plan:  Replete K  Can d/c ROBERT Johns MD
    NUBIA PRAKASH  70y  Female    Patient is a 70y old  Female who presents with a chief complaint of ams/  s/p fall (2021 15:25)    awake and alert  ate marginally.   denies any chest pain or shortness of breath.    PAST MEDICAL & SURGICAL HISTORY:  No pertinent past medical history    Dyspnea    Malaise and fatigue    Anxiety    UTI (urinary tract infection)    Gastritis    Colitis    Asthma    Dorsalgia    Pulmonary fibrosis    Atrial fibrillation    No significant past surgical history            PHYSICAL EXAM:    T(C): 36.8 (21 @ 09:24), Max: 36.9 (21 @ 04:55)  HR: 66 (21 @ 09:24) (58 - 66)  BP: 93/46 (21 @ 09:24) (83/38 - 114/48)  RR: 20 (21 @ 09:24) (19 - 22)  SpO2: 99% (21 @ 09:24) (99% - 100%)  Wt(kg): --    I&O's Detail    2021 07:01  -  2021 07:00  --------------------------------------------------------  IN:  Total IN: 0 mL    OUT:    Estimated Blood Loss (mL): 1 mL    Indwelling Catheter - Urethral (mL): 2500 mL  Total OUT: 2501 mL    Total NET: -2501 mL          Respiratory: clear anteriorly, decreased BS at bases  Cardiovascular: S1 S2  Gastrointestinal: soft NT ND +BS  Extremities: trace edema   Neuro: Awake and alert    MEDICATIONS  (STANDING):  bisacodyl 5 milliGRAM(s) Oral every 12 hours  cefTRIAXone   IVPB 1000 milliGRAM(s) IV Intermittent every 24 hours  chlorhexidine 2% Cloths 1 Application(s) Topical <User Schedule>  midodrine. 10 milliGRAM(s) Oral three times a day  polyethylene glycol 3350 17 Gram(s) Oral daily  senna 2 Tablet(s) Oral at bedtime  sevelamer carbonate 800 milliGRAM(s) Oral three times a day with meals  sodium chloride 0.9%. 1000 milliLiter(s) (100 mL/Hr) IV Continuous <Continuous>  sodium zirconium cyclosilicate 10 Gram(s) Oral two times a day    MEDICATIONS  (PRN):                            8.0    7.16  )-----------( 238      ( 2021 10:32 )             26.2       04-06    140  |  108  |  74<H>  ----------------------------<  79  4.2   |  19<L>  |  4.20<H>    Ca    6.9<L>      2021 07:27  Phos  7.3     04-  Mg     4.4     -06    TPro  5.2<L>  /  Alb  1.8<L>  /  TBili  0.5  /  DBili  .26<H>  /  AST  98<H>  /  ALT  34  /  AlkPhos  134<H>        Creatinine Trend: Creatinine Trend: 4.20<--, 4.87<--, 5.11<--, 5.74<--    Urinalysis Basic - ( 2021 15:45 )    Color: Yellow / Appearance: very cloudy / S.015 / pH: x  Gluc: x / Ketone: Negative  / Bili: Small / Urobili: Negative mg/dL   Blood: x / Protein: 30 mg/dL / Nitrite: Negative   Leuk Esterase: Moderate / RBC: 6-10 /HPF / WBC 11-25   Sq Epi: x / Non Sq Epi: x / Bacteria: Few                
[facility] NEPHROLOGY SERVICES, [facility]  NEPHROLOGY AND HYPERTENSION  [address]  [address]  [place] [address]  [phone]    MD CEDRIC RUEDA, MD BRIGITTE BAUTISTA, MD JUS DAVIS, MD JACOB ERWIN, MD ANANYA HAZEL, MD AFSANEH AMEZQUITA MD          Patient events noted    MEDICATIONS  (STANDING):  chlorhexidine 2% Cloths 1 Application(s) Topical <User Schedule>  midodrine. 10 milliGRAM(s) Oral three times a day  polyethylene glycol 3350 17 Gram(s) Oral daily  potassium chloride   Powder 40 milliEquivalent(s) Oral every 2 hours  senna 2 Tablet(s) Oral at bedtime  sevelamer carbonate 800 milliGRAM(s) Oral three times a day with meals  sodium chloride 0.9%. 1000 milliLiter(s) (100 mL/Hr) IV Continuous <Continuous>    MEDICATIONS  (PRN):  acetaminophen 300 mG/codeine 30 mG 1 Tablet(s) Oral every 6 hours PRN Moderate Pain (4 - 6)  ALPRAZolam 0.5 milliGRAM(s) Oral two times a day PRN anxiety      04-06-21 @ 07:01  -  04-07-21 @ 07:00  --------------------------------------------------------  IN: 720 mL / OUT: 1800 mL / NET: -1080 mL    04-07-21 @ 07:01  -  04-07-21 @ 21:43  --------------------------------------------------------  IN: 120 mL / OUT: 1450 mL / NET: -1330 mL      PHYSICAL EXAM:      T(C): 36.8 (04-07-21 @ 16:20), Max: 36.8 (04-07-21 @ 16:20)  HR: 66 (04-07-21 @ 16:20) (66 - 72)  BP: 106/65 (04-07-21 @ 16:20) (93/61 - 106/65)  RR: 18 (04-07-21 @ 16:20) (18 - 18)  SpO2: 97% (04-07-21 @ 16:20) (97% - 99%)  Wt(kg): --  Lungs clear  Heart S1S2  Abd soft NT ND  Extremities:   tr edema                                    7.4    5.15  )-----------( 220      ( 07 Apr 2021 07:40 )             24.3     04-07    140  |  107  |  53<H>  ----------------------------<  76  3.0<L>   |  22  |  2.49<H>    Ca    7.0<L> Ca 8.6 corrected       07 Apr 2021 07:40  Phos  7.3     04-06  Mg     4.4     04-06    TPro  5.2<L>  /  Alb  1.8<L>  /  TBili  0.5  /  DBili  .26<H>  /  AST  98<H>  /  ALT  34  /  AlkPhos  134<H>  04-06      LIVER FUNCTIONS - ( 06 Apr 2021 07:27 )  Alb: 1.8 g/dL / Pro: 5.2 gm/dL / ALK PHOS: 134 U/L / ALT: 34 U/L / AST: 98 U/L / GGT: x           Creatinine Trend: 2.49<--, 4.20<--, 4.87<--, 5.11<--, 5.74<--  04-07-21 @ 07:40  BUN/CR -  53<H> / 2.49<H>  04-06-21 @ 07:27  BUN/CR -  74<H> / 4.20<H>  04-05-21 @ 22:40  BUN/CR -  76<H> / 4.87<H>  04-05-21 @ 16:36  BUN/CR -  77<H> / 5.11<H>  04-05-21 @ 11:01  BUN/CR -  77<H> / 5.74<H>      Assessment   JOAQUIN, CKD degree unclear, pre/ post renal factors  Improving     Plan:  Continued IVF, supportive care        Omar Johns MD
Subjective: anxious, wants to leave.       MEDICATIONS  (STANDING):  chlorhexidine 2% Cloths 1 Application(s) Topical <User Schedule>  midodrine. 10 milliGRAM(s) Oral three times a day  potassium chloride    Tablet ER 40 milliEquivalent(s) Oral every 4 hours  senna 2 Tablet(s) Oral at bedtime  sevelamer carbonate 800 milliGRAM(s) Oral three times a day with meals    MEDICATIONS  (PRN):  acetaminophen 300 mG/codeine 30 mG 1 Tablet(s) Oral every 6 hours PRN Moderate Pain (4 - 6)  ALPRAZolam 0.5 milliGRAM(s) Oral two times a day PRN anxiety          T(C): 36.8 (04-08-21 @ 02:15), Max: 36.8 (04-07-21 @ 16:20)  HR: 69 (04-08-21 @ 02:15) (66 - 69)  BP: 109/79 (04-08-21 @ 02:15) (105/61 - 109/79)  RR: 19 (04-08-21 @ 02:15) (18 - 19)  SpO2: 95% (04-08-21 @ 02:15) (95% - 99%)  Wt(kg): --        I&O's Detail    07 Apr 2021 07:01  -  08 Apr 2021 07:00  --------------------------------------------------------  IN:    Oral Fluid: 240 mL    sodium chloride 0.9%: 1200 mL  Total IN: 1440 mL    OUT:    Indwelling Catheter - Urethral (mL): 3100 mL  Total OUT: 3100 mL    Total NET: -1660 mL               PHYSICAL EXAM:      NECK: Supple, no inc in JVP  CHEST/LUNG: Clear  HEART: S1S2  ABDOMEN: Soft, Nontender, Nondistended; Bowel sounds present  EXTREMITIES:  non-pitting edema      LABS:  CBC Full  -  ( 08 Apr 2021 08:31 )  WBC Count : 5.29 K/uL  RBC Count : 2.86 M/uL  Hemoglobin : 7.8 g/dL  Hematocrit : 25.6 %  Platelet Count - Automated : 218 K/uL  Mean Cell Volume : 89.5 fl  Mean Cell Hemoglobin : 27.3 pg  Mean Cell Hemoglobin Concentration : 30.5 gm/dL  Auto Neutrophil # : x  Auto Lymphocyte # : x  Auto Monocyte # : x  Auto Eosinophil # : x  Auto Basophil # : x  Auto Neutrophil % : x  Auto Lymphocyte % : x  Auto Monocyte % : x  Auto Eosinophil % : x  Auto Basophil % : x    04-08    142  |  109<H>  |  30<H>  ----------------------------<  84  3.1<L>   |  25  |  1.25    Ca    6.8<L>      08 Apr 2021 08:31  Phos  2.3     04-08  Mg     2.6     04-08    TPro  5.2<L>  /  Alb  1.5<L>  /  TBili  0.4  /  DBili  x   /  AST  43<H>  /  ALT  33  /  AlkPhos  174<H>  04-08    PT/INR - ( 08 Apr 2021 08:31 )   PT: 65.4 sec;   INR: 6.15 ratio             Culture Results:   <10,000 CFU/mL Normal Urogenital Frances (04-06 @ 00:27)  Culture Results:   Growth in aerobic bottle: Proteus mirabilis  ***Blood Panel PCR results on this specimen are available  approximately 3 hours after the Gram stain result.***  Gram stain, PCR, and/or culture results may not always  correspond due to difference in methodologies.  ************************************************************  This PCR assay was performed by multiplex PCR. This  Assay tests for 66 bacterial and resistance gene targets.  Please refer to the [email or web address] test directory  at [email or web address] for details. (04-05 @ 14:20)  Culture Results:   Growth in anaerobic bottle: Proteus mirabilis  See previous culture [id number] (04-05 @ 14:20)            Assessment :  JOAQUIN -- pre-renal  HypoK     Plan:  D/c IVFs  KCL 40 meq ordered          
Patient is a 70y old  Female who presents with a chief complaint of ams/  s/p fall (08 Apr 2021 18:22)      INTERVAL HPI/OVERNIGHT EVENTS: no events     MEDICATIONS  (STANDING):  chlorhexidine 2% Cloths 1 Application(s) Topical <User Schedule>  ferrous    sulfate 325 milliGRAM(s) Oral daily  midodrine. 10 milliGRAM(s) Oral three times a day  potassium chloride    Tablet ER 40 milliEquivalent(s) Oral every 4 hours  senna 2 Tablet(s) Oral at bedtime  sevelamer carbonate 800 milliGRAM(s) Oral three times a day with meals    MEDICATIONS  (PRN):  acetaminophen 300 mG/codeine 30 mG 1 Tablet(s) Oral every 6 hours PRN Moderate Pain (4 - 6)  ALPRAZolam 0.5 milliGRAM(s) Oral two times a day PRN anxiety      Allergies    penicillin (Seizure)  shellfish (Swelling; Short breath)  Sulfur (Hives)    Intolerances       Vital Signs Last 24 Hrs  T(C): 36.6 (09 Apr 2021 11:00), Max: 37.3 (09 Apr 2021 00:23)  T(F): 97.8 (09 Apr 2021 11:00), Max: 99.2 (09 Apr 2021 00:23)  HR: 61 (09 Apr 2021 11:00) (53 - 67)  BP: 96/60 (09 Apr 2021 11:00) (96/60 - 116/59)  BP(mean): --  RR: 18 (09 Apr 2021 11:00) (18 - 18)  SpO2: 96% (09 Apr 2021 11:00) (96% - 100%)    PHYSICAL EXAM:  GENERAL: NAD, well-groomed, well-developed  HEAD:  Atraumatic, Normocephalic  EYES: EOMI, PERRLA, conjunctiva and sclera clear  ENMT: No tonsillar erythema, exudates, or enlargement; Moist mucous membranes, Good dentition, No lesions  NECK: Supple, No JVD, Normal thyroid  NERVOUS SYSTEM:  Alert & Oriented X3, Good concentration; Motor Strength 5/5 B/L upper and lower extremities; DTRs 2+ intact and symmetric  CHEST/LUNG: Clear to percussion bilaterally; No rales, rhonchi, wheezing, or rubs  HEART: Regular rate and rhythm; No murmurs, rubs, or gallops  ABDOMEN: Soft, Nontender, Nondistended; Bowel sounds present  EXTREMITIES:  2+ Peripheral Pulses, No clubbing, cyanosis, or edema  LYMPH: No lymphadenopathy noted  SKIN: No rashes or lesions    LABS:                        7.8    5.25  )-----------( 196      ( 09 Apr 2021 07:58 )             26.3     04-09    140  |  106  |  18  ----------------------------<  110<H>  3.0<L>   |  26  |  0.77    Ca    7.1<L>      09 Apr 2021 07:58  Phos  2.3     04-08  Mg     2.6     04-08    TPro  4.9<L>  /  Alb  1.5<L>  /  TBili  0.5  /  DBili  x   /  AST  30  /  ALT  27  /  AlkPhos  157<H>  04-09    PT/INR - ( 09 Apr 2021 07:58 )   PT: 41.4 sec;   INR: 3.80 ratio             CAPILLARY BLOOD GLUCOSE          RADIOLOGY & ADDITIONAL TESTS:    Imaging Personally Reviewed:  [ X] YES  [ ] NO    Consultant(s) Notes Reviewed:  [ X] YES  [ ] NO    Care Discussed with Consultants/Other Providers [X ] YES  [ ] NO

## 2021-04-09 NOTE — PROGRESS NOTE ADULT - REASON FOR ADMISSION
ams/  s/p fall

## 2021-04-10 ENCOUNTER — TRANSCRIPTION ENCOUNTER (OUTPATIENT)
Age: 71
End: 2021-04-10

## 2021-04-10 VITALS
HEART RATE: 71 BPM | TEMPERATURE: 98 F | SYSTOLIC BLOOD PRESSURE: 110 MMHG | OXYGEN SATURATION: 96 % | RESPIRATION RATE: 18 BRPM | DIASTOLIC BLOOD PRESSURE: 657 MMHG

## 2021-04-10 LAB
ANION GAP SERPL CALC-SCNC: 5 MMOL/L — SIGNIFICANT CHANGE UP (ref 5–17)
BUN SERPL-MCNC: 13 MG/DL — SIGNIFICANT CHANGE UP (ref 7–23)
CALCIUM SERPL-MCNC: 7.1 MG/DL — LOW (ref 8.5–10.1)
CHLORIDE SERPL-SCNC: 106 MMOL/L — SIGNIFICANT CHANGE UP (ref 96–108)
CO2 SERPL-SCNC: 28 MMOL/L — SIGNIFICANT CHANGE UP (ref 22–31)
CREAT SERPL-MCNC: 0.62 MG/DL — SIGNIFICANT CHANGE UP (ref 0.5–1.3)
GLUCOSE SERPL-MCNC: 125 MG/DL — HIGH (ref 70–99)
INR BLD: 2.54 RATIO — HIGH (ref 0.88–1.16)
POTASSIUM SERPL-MCNC: 3.8 MMOL/L — SIGNIFICANT CHANGE UP (ref 3.5–5.3)
POTASSIUM SERPL-SCNC: 3.8 MMOL/L — SIGNIFICANT CHANGE UP (ref 3.5–5.3)
PROTHROM AB SERPL-ACNC: 28.2 SEC — HIGH (ref 10.6–13.6)
SODIUM SERPL-SCNC: 139 MMOL/L — SIGNIFICANT CHANGE UP (ref 135–145)

## 2021-04-10 PROCEDURE — 99239 HOSP IP/OBS DSCHRG MGMT >30: CPT

## 2021-04-10 RX ORDER — PROPRANOLOL HCL 160 MG
0 CAPSULE, EXTENDED RELEASE 24HR ORAL
Qty: 0 | Refills: 0 | DISCHARGE

## 2021-04-10 RX ORDER — MOXIFLOXACIN HYDROCHLORIDE TABLETS, 400 MG 400 MG/1
1 TABLET, FILM COATED ORAL
Qty: 10 | Refills: 0
Start: 2021-04-10 | End: 2021-04-14

## 2021-04-10 RX ORDER — DOCUSATE SODIUM 100 MG
0 CAPSULE ORAL
Qty: 0 | Refills: 0 | DISCHARGE

## 2021-04-10 RX ORDER — RIVAROXABAN 15 MG-20MG
1 KIT ORAL
Qty: 0 | Refills: 0 | DISCHARGE

## 2021-04-10 RX ORDER — DIGOXIN 250 MCG
1 TABLET ORAL
Qty: 0 | Refills: 0 | DISCHARGE

## 2021-04-10 RX ORDER — SENNA PLUS 8.6 MG/1
2 TABLET ORAL
Qty: 0 | Refills: 0 | DISCHARGE
Start: 2021-04-10

## 2021-04-10 RX ORDER — DILTIAZEM HCL 120 MG
1 CAPSULE, EXT RELEASE 24 HR ORAL
Qty: 0 | Refills: 0 | DISCHARGE

## 2021-04-10 RX ORDER — MIDODRINE HYDROCHLORIDE 2.5 MG/1
1 TABLET ORAL
Qty: 0 | Refills: 0 | DISCHARGE
Start: 2021-04-10

## 2021-04-10 RX ORDER — ALPRAZOLAM 0.25 MG
1 TABLET ORAL
Qty: 0 | Refills: 0 | DISCHARGE
Start: 2021-04-10

## 2021-04-10 RX ORDER — FERROUS SULFATE 325(65) MG
1 TABLET ORAL
Qty: 0 | Refills: 0 | DISCHARGE
Start: 2021-04-10

## 2021-04-10 RX ADMIN — Medication 1 TABLET(S): at 12:51

## 2021-04-10 RX ADMIN — MIDODRINE HYDROCHLORIDE 10 MILLIGRAM(S): 2.5 TABLET ORAL at 05:25

## 2021-04-10 RX ADMIN — SEVELAMER CARBONATE 800 MILLIGRAM(S): 2400 POWDER, FOR SUSPENSION ORAL at 12:51

## 2021-04-10 RX ADMIN — Medication 1 TABLET(S): at 04:59

## 2021-04-10 RX ADMIN — Medication 0.5 MILLIGRAM(S): at 14:49

## 2021-04-10 RX ADMIN — Medication 1 TABLET(S): at 13:45

## 2021-04-10 RX ADMIN — SEVELAMER CARBONATE 800 MILLIGRAM(S): 2400 POWDER, FOR SUSPENSION ORAL at 08:39

## 2021-04-10 RX ADMIN — Medication 1 TABLET(S): at 05:11

## 2021-04-10 RX ADMIN — CHLORHEXIDINE GLUCONATE 1 APPLICATION(S): 213 SOLUTION TOPICAL at 05:25

## 2021-04-10 RX ADMIN — MIDODRINE HYDROCHLORIDE 10 MILLIGRAM(S): 2.5 TABLET ORAL at 11:12

## 2021-04-10 RX ADMIN — Medication 325 MILLIGRAM(S): at 11:12

## 2021-04-10 NOTE — DISCHARGE NOTE PROVIDER - CARE PROVIDER_API CALL
your primary doctor,   Phone: (   )    -  Fax: (   )    -  Follow Up Time:     [place] [address]  Phone: [phone]  Fax: [phone]  Follow Up Time:

## 2021-04-10 NOTE — DISCHARGE NOTE NURSING/CASE MANAGEMENT/SOCIAL WORK - PATIENT PORTAL LINK FT
You can access the FollowMyHealth Patient Portal offered by [facility] by registering at the following website: [email or web address]. By joining [facility]’s FollowMyHealth portal, you will also be able to view your health information using other applications (apps) compatible with our system.

## 2021-04-10 NOTE — DISCHARGE NOTE PROVIDER - PROVIDER TOKENS
FREE:[LAST:[your primary doctor],PHONE:[(   )    -],FAX:[(   )    -]],PROVIDER:[TOKEN:[[id number]]]

## 2021-04-10 NOTE — DISCHARGE NOTE PROVIDER - HOSPITAL COURSE
70F with Atrial Fibrillation, and HTN admitted for Fall and Rhabdomyolysis     Rhabdomyolysis- unrelated to trauma  improved        Syncope / Atrial Fibrillation  Rate controlled; Holding Digoxin and Cardizem for now due to low BP  Warfarin for AC and increased risk of DVT and PE due to possible Malignancy , inr 2.5 , on 5mg coumadin monitor inr as it was high     Acute Renal Failure  improving   Worsened most likely from underlying Rhabdomyolysis and possible obstruction from compression of impacted stool in rectum   Nephrology on board     repleted hypokalemia       Gram neg bacteremia-proteus w Sepsis POA   -can change to pox abx to complete 7days     ? Malignancy   Imaging reviewed      pt refusing work up now f/u w pcp/gi       Anemia. pt refusing blood for now, monitor started iron     Sacral Ulcer   DTI to R heel, 4x3.5cm,  DTI to R calf, 12x2.3cm,   Stage II Pressure injury to L heel, 16x12x.1cm,    DTI R superior buttock, 4x5cm,   Stage II pressure injury R inferior buttock, 2x2.5x.1cm,  Stage II pressure injury L buttock, 5x5.5x.1cm    PT wound is on board     Diet  Regular      Psych on board, per psych able to make medical decisions. Unable to return home as per APS will need rehab placement

## 2021-04-10 NOTE — DISCHARGE NOTE PROVIDER - NSDCCPCAREPLAN_GEN_ALL_CORE_FT
PRINCIPAL DISCHARGE DIAGNOSIS  Diagnosis: Decubitus skin ulcer  Assessment and Plan of Treatment: Follow up with gi/primarydoctor/cardiology  Resume coumadin 5mg, monitor inr , repeat in AM   finish antibiotics [id number] bid 5more days        SECONDARY DISCHARGE DIAGNOSES  Diagnosis: Renal failure  Assessment and Plan of Treatment:     Diagnosis: Hyperkalemia  Assessment and Plan of Treatment:

## 2021-04-10 NOTE — DISCHARGE NOTE PROVIDER - NSDCMRMEDTOKEN_GEN_ALL_CORE_FT
ALPRAZolam 0.5 mg oral tablet: 1 tab(s) orally 2 times a day, As needed, anxiety  Cipro 500 mg oral tablet: 1 tab(s) orally 2 times a day   ferrous sulfate 325 mg (65 mg elemental iron) oral tablet: 1 tab(s) orally once a day  folic acid 1 mg oral tablet: 1 tab(s) orally once a day  gabapentin 300 mg oral capsule: orally once a day  midodrine 10 mg oral tablet: 1 tab(s) orally 3 times a day  senna oral tablet: 2 tab(s) orally once a day (at bedtime)  warfarin 5 mg oral tablet: 1 tab(s) orally once a day

## 2021-04-23 DIAGNOSIS — N17.9 ACUTE KIDNEY FAILURE, UNSPECIFIED: ICD-10-CM

## 2021-04-23 DIAGNOSIS — E83.41 HYPERMAGNESEMIA: ICD-10-CM

## 2021-04-23 DIAGNOSIS — Z79.01 LONG TERM (CURRENT) USE OF ANTICOAGULANTS: ICD-10-CM

## 2021-04-23 DIAGNOSIS — B96.4 PROTEUS (MIRABILIS) (MORGANII) AS THE CAUSE OF DISEASES CLASSIFIED ELSEWHERE: ICD-10-CM

## 2021-04-23 DIAGNOSIS — L89.312 PRESSURE ULCER OF RIGHT BUTTOCK, STAGE 2: ICD-10-CM

## 2021-04-23 DIAGNOSIS — E66.9 OBESITY, UNSPECIFIED: ICD-10-CM

## 2021-04-23 DIAGNOSIS — N39.0 URINARY TRACT INFECTION, SITE NOT SPECIFIED: ICD-10-CM

## 2021-04-23 DIAGNOSIS — L89.622 PRESSURE ULCER OF LEFT HEEL, STAGE 2: ICD-10-CM

## 2021-04-23 DIAGNOSIS — L89.152 PRESSURE ULCER OF SACRAL REGION, STAGE 2: ICD-10-CM

## 2021-04-23 DIAGNOSIS — Z88.0 ALLERGY STATUS TO PENICILLIN: ICD-10-CM

## 2021-04-23 DIAGNOSIS — E87.6 HYPOKALEMIA: ICD-10-CM

## 2021-04-23 DIAGNOSIS — L89.322 PRESSURE ULCER OF LEFT BUTTOCK, STAGE 2: ICD-10-CM

## 2021-04-23 DIAGNOSIS — N18.9 CHRONIC KIDNEY DISEASE, UNSPECIFIED: ICD-10-CM

## 2021-04-23 DIAGNOSIS — Z91.013 ALLERGY TO SEAFOOD: ICD-10-CM

## 2021-04-23 DIAGNOSIS — M62.82 RHABDOMYOLYSIS: ICD-10-CM

## 2021-04-23 DIAGNOSIS — E87.5 HYPERKALEMIA: ICD-10-CM

## 2021-04-23 DIAGNOSIS — R41.82 ALTERED MENTAL STATUS, UNSPECIFIED: ICD-10-CM

## 2021-04-23 DIAGNOSIS — I95.9 HYPOTENSION, UNSPECIFIED: ICD-10-CM

## 2021-04-23 DIAGNOSIS — I48.91 UNSPECIFIED ATRIAL FIBRILLATION: ICD-10-CM

## 2021-04-23 DIAGNOSIS — F41.9 ANXIETY DISORDER, UNSPECIFIED: ICD-10-CM

## 2021-04-23 DIAGNOSIS — A41.59 OTHER GRAM-NEGATIVE SEPSIS: ICD-10-CM

## 2021-04-23 DIAGNOSIS — R65.21 SEVERE SEPSIS WITH SEPTIC SHOCK: ICD-10-CM

## 2021-04-23 DIAGNOSIS — L89.616 PRESSURE-INDUCED DEEP TISSUE DAMAGE OF RIGHT HEEL: ICD-10-CM

## 2021-04-23 DIAGNOSIS — I12.9 HYPERTENSIVE CHRONIC KIDNEY DISEASE WITH STAGE 1 THROUGH STAGE 4 CHRONIC KIDNEY DISEASE, OR UNSPECIFIED CHRONIC KIDNEY DISEASE: ICD-10-CM

## 2021-04-23 DIAGNOSIS — R41.0 DISORIENTATION, UNSPECIFIED: ICD-10-CM

## 2021-04-23 DIAGNOSIS — E83.39 OTHER DISORDERS OF PHOSPHORUS METABOLISM: ICD-10-CM

## 2021-04-23 DIAGNOSIS — J45.909 UNSPECIFIED ASTHMA, UNCOMPLICATED: ICD-10-CM

## 2021-04-23 DIAGNOSIS — Z88.2 ALLERGY STATUS TO SULFONAMIDES: ICD-10-CM

## 2021-04-23 DIAGNOSIS — M25.551 PAIN IN RIGHT HIP: ICD-10-CM

## 2021-04-23 DIAGNOSIS — D64.9 ANEMIA, UNSPECIFIED: ICD-10-CM

## 2021-04-23 DIAGNOSIS — K56.41 FECAL IMPACTION: ICD-10-CM

## 2021-07-15 NOTE — PATIENT PROFILE ADULT - IS THERE A SUSPICION OF ABUSE/NEGLIGENCE?
S/W Yue Aviles at the pharmacy  She stated the script to start on 6/13 was filled on 6/15 and the next script she has is for 7/23  That is over 1 month  Does the 7/23 script need to be filled sooner? Please advise  no

## 2022-03-30 NOTE — BH CONSULTATION LIAISON ASSESSMENT NOTE - PATIENT'S CHIEF COMPLAINT
no loss of consciousness, no gait abnormality, no headache, no sensory deficits, and no weakness.
"it hurts"

## 2022-10-31 NOTE — CONSULT NOTE ADULT - TIME-BASED
68 Opioid Counseling: I discussed with the patient the potential side effects of opioids including but not limited to addiction, altered mental status, and depression. I stressed avoiding alcohol, benzodiazepines, muscle relaxants and sleep aids unless specifically okayed by a physician. The patient verbalized understanding of the proper use and possible adverse effects of opioids. All of the patient's questions and concerns were addressed. They were instructed to flush the remaining pills down the toilet if they did not need them for pain.

## 2024-01-16 NOTE — PHYSICAL THERAPY INITIAL EVALUATION ADULT - PHYSICAL ASSIST/NONPHYSICAL ASSIST, REHAB EVAL
No she is supposed to skip the placebo pill and go straight into the next pack to skip her period  Eloisa aMjano MD FACOG  OB/GYN  1/16/2024 1:21 PM     verbal cues/nonverbal cues (demo/gestures)/2 person assist